# Patient Record
Sex: FEMALE | Race: BLACK OR AFRICAN AMERICAN | Employment: OTHER | URBAN - METROPOLITAN AREA
[De-identification: names, ages, dates, MRNs, and addresses within clinical notes are randomized per-mention and may not be internally consistent; named-entity substitution may affect disease eponyms.]

---

## 2017-04-10 ENCOUNTER — ANESTHESIA EVENT (OUTPATIENT)
Dept: SURGERY | Age: 77
End: 2017-04-10
Payer: MEDICARE

## 2017-04-10 RX ORDER — SODIUM CHLORIDE, SODIUM LACTATE, POTASSIUM CHLORIDE, CALCIUM CHLORIDE 600; 310; 30; 20 MG/100ML; MG/100ML; MG/100ML; MG/100ML
75 INJECTION, SOLUTION INTRAVENOUS CONTINUOUS
Status: CANCELLED | OUTPATIENT
Start: 2017-04-11 | End: 2017-04-11

## 2017-04-11 ENCOUNTER — ANESTHESIA (OUTPATIENT)
Dept: SURGERY | Age: 77
End: 2017-04-11
Payer: MEDICARE

## 2017-04-11 ENCOUNTER — HOSPITAL ENCOUNTER (OUTPATIENT)
Age: 77
Setting detail: OUTPATIENT SURGERY
Discharge: HOME | End: 2017-04-11
Attending: SURGERY | Admitting: SURGERY
Payer: MEDICARE

## 2017-04-11 VITALS
HEART RATE: 65 BPM | BODY MASS INDEX: 30.58 KG/M2 | RESPIRATION RATE: 18 BRPM | TEMPERATURE: 97.7 F | OXYGEN SATURATION: 100 % | DIASTOLIC BLOOD PRESSURE: 62 MMHG | HEIGHT: 61 IN | SYSTOLIC BLOOD PRESSURE: 142 MMHG | WEIGHT: 162 LBS

## 2017-04-11 PROBLEM — I73.9 PVD (PERIPHERAL VASCULAR DISEASE) (HCC): Chronic | Status: ACTIVE | Noted: 2017-04-11

## 2017-04-11 LAB
ANION GAP BLD CALC-SCNC: 10 MMOL/L (ref 3–18)
BUN SERPL-MCNC: 32 MG/DL (ref 7–18)
BUN/CREAT SERPL: 4 (ref 12–20)
CALCIUM SERPL-MCNC: 8.8 MG/DL (ref 8.5–10.1)
CHLORIDE SERPL-SCNC: 101 MMOL/L (ref 100–108)
CO2 SERPL-SCNC: 33 MMOL/L (ref 21–32)
CREAT SERPL-MCNC: 7.43 MG/DL (ref 0.6–1.3)
GLUCOSE BLD STRIP.AUTO-MCNC: 100 MG/DL (ref 70–110)
GLUCOSE BLD STRIP.AUTO-MCNC: 130 MG/DL (ref 70–110)
GLUCOSE SERPL-MCNC: 126 MG/DL (ref 74–99)
POTASSIUM SERPL-SCNC: 4.1 MMOL/L (ref 3.5–5.5)
SODIUM SERPL-SCNC: 144 MMOL/L (ref 136–145)

## 2017-04-11 PROCEDURE — 74011000272 HC RX REV CODE- 272: Performed by: SURGERY

## 2017-04-11 PROCEDURE — 77030002987 HC SUT PROL J&J -B: Performed by: SURGERY

## 2017-04-11 PROCEDURE — 93005 ELECTROCARDIOGRAM TRACING: CPT

## 2017-04-11 PROCEDURE — 76060000033 HC ANESTHESIA 1 TO 1.5 HR: Performed by: SURGERY

## 2017-04-11 PROCEDURE — 77030010507 HC ADH SKN DERMBND J&J -B: Performed by: SURGERY

## 2017-04-11 PROCEDURE — 77030018836 HC SOL IRR NACL ICUM -A: Performed by: SURGERY

## 2017-04-11 PROCEDURE — 77030012510 HC MSK AIRWY LMA TELE -B: Performed by: ANESTHESIOLOGY

## 2017-04-11 PROCEDURE — 82962 GLUCOSE BLOOD TEST: CPT

## 2017-04-11 PROCEDURE — 77030031139 HC SUT VCRL2 J&J -A: Performed by: SURGERY

## 2017-04-11 PROCEDURE — 77030002996 HC SUT SLK J&J -A: Performed by: SURGERY

## 2017-04-11 PROCEDURE — 74011250636 HC RX REV CODE- 250/636

## 2017-04-11 PROCEDURE — 74011250636 HC RX REV CODE- 250/636: Performed by: NURSE ANESTHETIST, CERTIFIED REGISTERED

## 2017-04-11 PROCEDURE — 36415 COLL VENOUS BLD VENIPUNCTURE: CPT | Performed by: NURSE ANESTHETIST, CERTIFIED REGISTERED

## 2017-04-11 PROCEDURE — 74011000258 HC RX REV CODE- 258: Performed by: SURGERY

## 2017-04-11 PROCEDURE — 77030014647 HC SEAL FBRN TISSL BAXT -D: Performed by: SURGERY

## 2017-04-11 PROCEDURE — 76210000021 HC REC RM PH II 0.5 TO 1 HR: Performed by: SURGERY

## 2017-04-11 PROCEDURE — 74011000250 HC RX REV CODE- 250

## 2017-04-11 PROCEDURE — 76210000016 HC OR PH I REC 1 TO 1.5 HR: Performed by: SURGERY

## 2017-04-11 PROCEDURE — 77030013079 HC BLNKT BAIR HGGR 3M -A: Performed by: ANESTHESIOLOGY

## 2017-04-11 PROCEDURE — 80048 BASIC METABOLIC PNL TOTAL CA: CPT | Performed by: NURSE ANESTHETIST, CERTIFIED REGISTERED

## 2017-04-11 PROCEDURE — 77030032490 HC SLV COMPR SCD KNE COVD -B: Performed by: SURGERY

## 2017-04-11 PROCEDURE — 77030011267 HC ELECTRD BLD COVD -A: Performed by: SURGERY

## 2017-04-11 PROCEDURE — 74011250637 HC RX REV CODE- 250/637: Performed by: NURSE ANESTHETIST, CERTIFIED REGISTERED

## 2017-04-11 PROCEDURE — 77030018846 HC SOL IRR STRL H20 ICUM -A: Performed by: SURGERY

## 2017-04-11 PROCEDURE — 74011000250 HC RX REV CODE- 250: Performed by: SURGERY

## 2017-04-11 PROCEDURE — 77030020256 HC SOL INJ NACL 0.9%  500ML: Performed by: SURGERY

## 2017-04-11 PROCEDURE — 76010000149 HC OR TIME 1 TO 1.5 HR: Performed by: SURGERY

## 2017-04-11 PROCEDURE — 74011250636 HC RX REV CODE- 250/636: Performed by: SURGERY

## 2017-04-11 PROCEDURE — 77030002933 HC SUT MCRYL J&J -A: Performed by: SURGERY

## 2017-04-11 RX ORDER — OXYCODONE AND ACETAMINOPHEN 5; 325 MG/1; MG/1
1 TABLET ORAL
Qty: 30 TAB | Refills: 0 | Status: SHIPPED | OUTPATIENT
Start: 2017-04-11 | End: 2018-09-25

## 2017-04-11 RX ORDER — MAGNESIUM SULFATE 100 %
4 CRYSTALS MISCELLANEOUS AS NEEDED
Status: DISCONTINUED | OUTPATIENT
Start: 2017-04-11 | End: 2017-04-11 | Stop reason: HOSPADM

## 2017-04-11 RX ORDER — PROPOFOL 10 MG/ML
INJECTION, EMULSION INTRAVENOUS AS NEEDED
Status: DISCONTINUED | OUTPATIENT
Start: 2017-04-11 | End: 2017-04-11 | Stop reason: HOSPADM

## 2017-04-11 RX ORDER — SODIUM CHLORIDE, SODIUM LACTATE, POTASSIUM CHLORIDE, CALCIUM CHLORIDE 600; 310; 30; 20 MG/100ML; MG/100ML; MG/100ML; MG/100ML
50 INJECTION, SOLUTION INTRAVENOUS CONTINUOUS
Status: DISCONTINUED | OUTPATIENT
Start: 2017-04-11 | End: 2017-04-11 | Stop reason: HOSPADM

## 2017-04-11 RX ORDER — INSULIN LISPRO 100 [IU]/ML
INJECTION, SOLUTION INTRAVENOUS; SUBCUTANEOUS ONCE
Status: DISCONTINUED | OUTPATIENT
Start: 2017-04-11 | End: 2017-04-11 | Stop reason: HOSPADM

## 2017-04-11 RX ORDER — FENTANYL CITRATE 50 UG/ML
INJECTION, SOLUTION INTRAMUSCULAR; INTRAVENOUS AS NEEDED
Status: DISCONTINUED | OUTPATIENT
Start: 2017-04-11 | End: 2017-04-11 | Stop reason: HOSPADM

## 2017-04-11 RX ORDER — HYDROMORPHONE HYDROCHLORIDE 2 MG/ML
0.5 INJECTION, SOLUTION INTRAMUSCULAR; INTRAVENOUS; SUBCUTANEOUS
Status: DISCONTINUED | OUTPATIENT
Start: 2017-04-11 | End: 2017-04-11 | Stop reason: HOSPADM

## 2017-04-11 RX ORDER — FENTANYL CITRATE 50 UG/ML
25 INJECTION, SOLUTION INTRAMUSCULAR; INTRAVENOUS
Status: DISCONTINUED | OUTPATIENT
Start: 2017-04-11 | End: 2017-04-11 | Stop reason: HOSPADM

## 2017-04-11 RX ORDER — ONDANSETRON 2 MG/ML
INJECTION INTRAMUSCULAR; INTRAVENOUS AS NEEDED
Status: DISCONTINUED | OUTPATIENT
Start: 2017-04-11 | End: 2017-04-11 | Stop reason: SDUPTHER

## 2017-04-11 RX ORDER — ONDANSETRON 2 MG/ML
4 INJECTION INTRAMUSCULAR; INTRAVENOUS
Status: DISCONTINUED | OUTPATIENT
Start: 2017-04-11 | End: 2017-04-11 | Stop reason: HOSPADM

## 2017-04-11 RX ORDER — LIDOCAINE HYDROCHLORIDE 20 MG/ML
INJECTION, SOLUTION EPIDURAL; INFILTRATION; INTRACAUDAL; PERINEURAL AS NEEDED
Status: DISCONTINUED | OUTPATIENT
Start: 2017-04-11 | End: 2017-04-11 | Stop reason: HOSPADM

## 2017-04-11 RX ORDER — HEPARIN SODIUM 1000 [USP'U]/ML
INJECTION, SOLUTION INTRAVENOUS; SUBCUTANEOUS AS NEEDED
Status: DISCONTINUED | OUTPATIENT
Start: 2017-04-11 | End: 2017-04-11 | Stop reason: HOSPADM

## 2017-04-11 RX ORDER — SODIUM CHLORIDE 9 MG/ML
500 INJECTION, SOLUTION INTRAVENOUS CONTINUOUS
Status: DISCONTINUED | OUTPATIENT
Start: 2017-04-11 | End: 2017-04-11 | Stop reason: HOSPADM

## 2017-04-11 RX ORDER — MIDAZOLAM HYDROCHLORIDE 1 MG/ML
INJECTION, SOLUTION INTRAMUSCULAR; INTRAVENOUS AS NEEDED
Status: DISCONTINUED | OUTPATIENT
Start: 2017-04-11 | End: 2017-04-11 | Stop reason: HOSPADM

## 2017-04-11 RX ORDER — FAMOTIDINE 20 MG/1
20 TABLET, FILM COATED ORAL ONCE
Status: COMPLETED | OUTPATIENT
Start: 2017-04-11 | End: 2017-04-11

## 2017-04-11 RX ORDER — OXYCODONE AND ACETAMINOPHEN 5; 325 MG/1; MG/1
1 TABLET ORAL
Status: DISCONTINUED | OUTPATIENT
Start: 2017-04-11 | End: 2017-04-11 | Stop reason: HOSPADM

## 2017-04-11 RX ORDER — DIPHENHYDRAMINE HYDROCHLORIDE 50 MG/ML
25 INJECTION, SOLUTION INTRAMUSCULAR; INTRAVENOUS
Status: DISCONTINUED | OUTPATIENT
Start: 2017-04-11 | End: 2017-04-11 | Stop reason: HOSPADM

## 2017-04-11 RX ORDER — DEXTROSE 50 % IN WATER (D50W) INTRAVENOUS SYRINGE
25-50 AS NEEDED
Status: DISCONTINUED | OUTPATIENT
Start: 2017-04-11 | End: 2017-04-11 | Stop reason: HOSPADM

## 2017-04-11 RX ADMIN — SODIUM CHLORIDE 500 MG: 900 INJECTION, SOLUTION INTRAVENOUS at 11:39

## 2017-04-11 RX ADMIN — MIDAZOLAM HYDROCHLORIDE 2 MG: 1 INJECTION, SOLUTION INTRAMUSCULAR; INTRAVENOUS at 12:59

## 2017-04-11 RX ADMIN — PROPOFOL 100 MG: 10 INJECTION, EMULSION INTRAVENOUS at 13:05

## 2017-04-11 RX ADMIN — HEPARIN SODIUM 4000 UNITS: 1000 INJECTION, SOLUTION INTRAVENOUS; SUBCUTANEOUS at 13:31

## 2017-04-11 RX ADMIN — FENTANYL CITRATE 50 MCG: 50 INJECTION, SOLUTION INTRAMUSCULAR; INTRAVENOUS at 13:05

## 2017-04-11 RX ADMIN — ONDANSETRON 4 MG: 2 INJECTION INTRAMUSCULAR; INTRAVENOUS at 14:11

## 2017-04-11 RX ADMIN — FAMOTIDINE 20 MG: 20 TABLET ORAL at 11:40

## 2017-04-11 RX ADMIN — SODIUM CHLORIDE 500 ML: 900 INJECTION, SOLUTION INTRAVENOUS at 11:39

## 2017-04-11 RX ADMIN — LIDOCAINE HYDROCHLORIDE 60 MG: 20 INJECTION, SOLUTION EPIDURAL; INFILTRATION; INTRACAUDAL; PERINEURAL at 13:05

## 2017-04-11 RX ADMIN — FENTANYL CITRATE 50 MCG: 50 INJECTION, SOLUTION INTRAMUSCULAR; INTRAVENOUS at 13:45

## 2017-04-11 NOTE — H&P
Surgery History and Physical    Subjective:   64 y/o female - female with ESRD stage 5, HD MWF,  H/o R Constance AVF 4/2014 - several interventions, ultimately R central vein outflow occlusion. L FA loop AVG/artegraft and R constance AVF ligation 5/12/15. Occluded 11/2015 - Ayala open thrombectomy with stent across antecubital area 12/2015. Then 2/2016, 5/2016, 8/2016, and 11/15/16 outflow vein 6mm PTA. Also h/o PVD: 8/2/16 R TPT atherectomy/4mm PTA, R AT occludes at the ankle. LLE - occluded prox peroneal art. Leg fatigue only. Most recently fistulogram with 5mm cutting balloon and 6mm PTA venous outflow 2/1/17. Has developed venous limb AVG (medially) large pseudoaneurysm - 2 1/2 cm with tenderness over the area as it is on the post medial aspect of the forearm and leans on the area sometimes. Denys Tacho Appears the remainder of the AVG is functioning on HD adequately. Presents for pseudoaneurysm repair. Last HD yesterday w/o problems. Patient Active Problem List    Diagnosis Date Noted    ESRD (end stage renal disease) ([facility] [address] 02/11/2014    Seizure disorder ([facility] [address] 02/11/2014    HTN (hypertension) 02/11/2014   ESRD on HD 2014, seizure disorder, HTN, DM, PVD, CAD. Past Medical History:   Diagnosis Date    Arthritis     CAD (coronary artery disease) 2014    triple bypass    Chronic kidney disease     DIALYSIS M-W-F    Diabetes ([address]     Hypertension     Renal dialysis status     S/P CABG x 3       Past Surgical History:   Procedure Laterality Date    ABDOMEN SURGERY PROC UNLISTED  1976    gall stones    CARDIAC SURG PROCEDURE UNLIST  AUG 2014    BYPASS-3 WAY    HX OTHER SURGICAL  1/2015    2 cardiac stents    HX VASCULAR ACCESS  4/2014    right IJ tunneled Dialysis catheter    HX VASCULAR ACCESS      right upper extremitiy fistulagram    ROB CATHETER  2/1/2014         see HPI for AVF/AVG surgeries/ interventions.      Social History   Substance Use Topics    Smoking status: Never Smoker    Smokeless tobacco: Never Used    Alcohol use No      History reviewed. No pertinent family history. Prior to Admission medications    Medication Sig Start Date End Date Taking? Authorizing Provider   calcium acetate (PHOSLO) 667 mg cap Take 2 Caps by mouth two (2) times a day. Yes Historical Provider   atorvastatin (LIPITOR) 10 mg tablet Take 10 mg by mouth daily. Yes Historical Provider   sevelamer (RENAGEL) 800 mg tablet Take  by mouth three (3) times daily (with meals). Yes Historical Provider   losartan (COZAAR) 100 mg tablet Take 1 Tab by mouth daily. 2/11/14  Yes Jessica Rey MD   aspirin 81 mg chewable tablet Take 81 mg by mouth daily. Yes Historical Provider   clopidogrel (PLAVIX) 75 mg tablet Take  by mouth daily. Historical Provider     Allergies   Allergen Reactions    Contrast Agent [Iodine] Rash    Penicillins Hives         Review of Systems:   Gen -no  fever / chills,  Normal appetite. Chronic fatigue, no recent illlnesses. HEENT - denies vision changes, no dysphagia  PULM - no cough/dyspnea  CV - denies chest pain, palpitations - w/ h/o CABG 2014  GI - no abd pain, no n/v, no diarrhea/constipation, no blood per rectum   - denies dysuria/hematuria/frequency  SKIN - no ulcers or dermatitits  MS - h/o DJD/osteroarthritis. NEURO - denies prior stroke or TIA, + seizure history - none recently, no significant headaches, no focal weakness. Objective:     Visit Vitals    Ht 5' 1\" (1.549 m)    Wt 75.8 kg (167 lb)    BMI 31.55 kg/m2     Physical Exam:  GEN: A&Ox3, NAD, comfortable. HEENT:PERRL EOMI, non icteric, moist membranes. NECK: no JVD, no carotid bruit, no LA, no TM  LUNG: clear b/l and unlabored breathing  HEART: regular w/o murmur noted  ABD: soft, NT,  NABS   EXT: no R arm edema, L forearm loop AVG somewhat pulsatile - venous / medial side of loop 2 1/2cm enlarged pseudoaneurysmal area.  On lateral side near arterial anast has somewhat enlarged area ~ 1 1/2 cm for ~4cm with evidence of repeated accesses at this site. PULSES: palpable radial / femoral pulses. Softly palpable pop pulses. Absent pedal pulses - audible doppler signals. Vernida Chard NEURO: no focal lateralizing deficits noted. Motor 5/5. Labs: 4/11 - K 4.1, glc 126, BUN 32, Cr 7.43,     AVVA 11/22/16: bilateral LE art duplex: patent bilateral CFA/SFA/pop all biphasic, distally monophasic; HAMLET R 0.88, L 1.11, TBI R 0.41, L 0.53. Assessment:   1. ESRD on HD via L forearm loop AVG with medial/ venous limb pseudoaneurysm development   2. PVD - last intervention 8/2016 - distal tibial disease bilaterally,   3. Diabetes  4. HTN  5. CAD - h/o CABG 2014 - asymptomatic. Plan:   Plan repair of L forearm loop AVG pseudoaneurysm - surgery / risks d/w pt - including pain, infection, bleeding, wound breakdown, additional intervention, thrombosis - she understands and agrees to proceed. Bea Jorgensen.  Crystal Coelho, [address] [facility]  [place] - [phone]  April 11, 2017  8:34 AM

## 2017-04-11 NOTE — IP AVS SNAPSHOT
Manuel Gibson 
 
 
 [address] 
[phone] Patient: Bettye Garcia MRN: [id number] You are allergic to the following Allergen Reactions Contrast Agent (Iodine) Rash Penicillins Hives Recent Documentation Height Weight BMI OB Status Smoking Status 1.549 m 73.5 kg 30.61 kg/m2 Postmenopausal Never Smoker Emergency Contacts Name Discharge Info Relation Home Work Mobile DooleyJimenezCades. DISCHARGE CAREGIVER [3] Spouse [3] [phone] Wayne Figueroa  Sister [23] [phone] Dayna Flores DISCHARGE CAREGIVER [3] Daughter [21] [phone] About your hospitalization You were admitted on:  April 11, 2017 [facility] You were discharged on:  April 11, 2017 Unit phone number:  [phone] Why you were hospitalized Your primary diagnosis was:  Not on File Providers Seen During Your Hospitalizations Provider Role Specialty Primary office phone Kaleb Villanueva MD Attending Provider Vascular Surgery [phone] Your Primary Care Physician (PCP) Primary Care Physician Office Phone Office Fax Lashell Kan, [address] [facility] [phone] Follow-up Information Follow up With Details Comments Contact Info Jay Zamora MD   [address] [place] [address] 
[phone] Current Discharge Medication List  
  
START taking these medications Dose & Instructions Dispensing Information Comments Morning Noon Evening Bedtime  
 oxyCODONE-acetaminophen 5-325 mg per tablet Commonly known as:  PERCOCET Your last dose was: Your next dose is:    
   
   
 Dose:  1 Tab Take 1 Tab by mouth every four (4) hours as needed for Pain. Max Daily Amount: 6 Tabs. Quantity:  30 Tab Refills:  0 CONTINUE these medications which have NOT CHANGED Dose & Instructions Dispensing Information Comments Morning Noon Evening Bedtime  
 aspirin 81 mg chewable tablet Your last dose was: Your next dose is:    
   
   
 Dose:  81 mg Take 81 mg by mouth daily. Refills:  0  
     
   
   
   
  
 atorvastatin 10 mg tablet Commonly known as:  LIPITOR Your last dose was: Your next dose is:    
   
   
 Dose:  10 mg Take 10 mg by mouth daily. Refills:  0  
     
   
   
   
  
 calcium acetate 667 mg Cap Commonly known as:  PHOSLO Your last dose was: Your next dose is:    
   
   
 Dose:  2 Cap Take 2 Caps by mouth two (2) times a day. Refills:  0  
     
   
   
   
  
 clopidogrel 75 mg Tab Commonly known as:  PLAVIX Your last dose was: Your next dose is: Take  by mouth daily. Refills:  0  
     
   
   
   
  
 losartan 100 mg tablet Commonly known as:  COZAAR Your last dose was: Your next dose is:    
   
   
 Dose:  100 mg Take 1 Tab by mouth daily. Quantity:  30 Tab Refills:  0  
     
   
   
   
  
 sevelamer 800 mg tablet Commonly known as:  RENAGEL Your last dose was: Your next dose is: Take  by mouth three (3) times daily (with meals). Refills:  0 Where to Get Your Medications Information on where to get these meds will be given to you by the nurse or doctor. ! Ask your nurse or doctor about these medications  
  oxyCODONE-acetaminophen 5-325 mg per tablet Discharge Instructions DISCHARGE SUMMARY from Nurse The following personal items are in your possession at time of discharge: 
 
Dental Appliances: None Visual Aid: Glasses Home Medications: None Jewelry: Angelic Jules Clothing: Shirt, Socks, Footwear, Undergarments, Pants (all belongings given to ) Other Valuables: None PATIENT INSTRUCTIONS: 
 
After general anesthesia or intravenous sedation, for 24 hours or while taking prescription Narcotics: · Limit your activities · Do not drive and operate hazardous machinery · Do not make important personal or business decisions · Do  not drink alcoholic beverages · If you have not urinated within 8 hours after discharge, please contact your surgeon on call. Report the following to your surgeon: 
· Excessive pain, swelling, redness or odor of or around the surgical area · Temperature over 100.5 · Nausea and vomiting lasting longer than 4 hours or if unable to take medications · Any signs of decreased circulation or nerve impairment to extremity: change in color, persistent  numbness, tingling, coldness or increase pain · Any questions What to do at Home: These are general instructions for a healthy lifestyle: No smoking/ No tobacco products/ Avoid exposure to second hand smoke Surgeon General's Warning:  Quitting smoking now greatly reduces serious risk to your health. Obesity, smoking, and sedentary lifestyle greatly increases your risk for illness A healthy diet, regular physical exercise & weight monitoring are important for maintaining a healthy lifestyle You may be retaining fluid if you have a history of heart failure or if you experience any of the following symptoms:  Weight gain of 3 pounds or more overnight or 5 pounds in a week, increased swelling in our hands or feet or shortness of breath while lying flat in bed. Please call your doctor as soon as you notice any of these symptoms; do not wait until your next office visit. Recognize signs and symptoms of STROKE: 
 
F-face looks uneven A-arms unable to move or move unevenly S-speech slurred or non-existent T-time-call 911 as soon as signs and symptoms begin-DO NOT go Back to bed or wait to see if you get better-TIME IS BRAIN.  
 
Warning Signs of HEART ATTACK  
 
 Call 911 if you have these symptoms: 
? Chest discomfort. Most heart attacks involve discomfort in the center of the chest that lasts more than a few minutes, or that goes away and comes back. It can feel like uncomfortable pressure, squeezing, fullness, or pain. ? Discomfort in other areas of the upper body. Symptoms can include pain or discomfort in one or both arms, the back, neck, jaw, or stomach. ? Shortness of breath with or without chest discomfort. ? Other signs may include breaking out in a cold sweat, nausea, or lightheadedness. Don't wait more than five minutes to call [address]! Fast action can save your life. Calling 911 is almost always the fastest way to get lifesaving treatment. Emergency Medical Services staff can begin treatment when they arrive  up to an hour sooner than if someone gets to the hospital by car. The discharge information has been reviewed with the patient. The patient verbalized understanding. Discharge medications reviewed with the patient and appropriate educational materials and side effects teaching were provided. Patient armband removed and given to patient to take home. Patient was informed of the privacy risks if armband lost or stolen Discharge Orders None Introducing [facility]! [employer] introduces [employer]Seek patient portal. Now you can access parts of your medical record, email your doctor's office, and request medication refills online. 1. In your internet browser, go to [email or web address] 2. Click on the First Time User? Click Here link in the Sign In box. You will see the New Member Sign Up page. 3. Enter your StyleSeek Access Code exactly as it appears below. You will not need to use this code after youve completed the sign-up process. If you do not sign up before the expiration date, you must request a new code. · StyleSeek Access Code: [email or web address] Expires: 7/5/2017 11:46 AM 
 
 4. Enter the last four digits of your Social Security Number (xxxx) and Date of Birth (mm/dd/yyyy) as indicated and click Submit. You will be taken to the next sign-up page. 5. Create a Haversack ID. This will be your Haversack login ID and cannot be changed, so think of one that is secure and easy to remember. 6. Create a Ha[employer]ack password. You can change your password at any time. 7. Enter your Password Reset Question and Answer. This can be used at a later time if you forget your password. 8. Enter your e-mail address. You will receive e-mail notification when new information is available in [address] 9. Click Sign Up. You can now view and download portions of your medical record. 10. Click the Download Summary menu link to download a portable copy of your medical information. If you have questions, please visit the Frequently Asked Questions section of the [employer] website. Remember, Haversack is NOT to be used for urgent needs. For medical emergencies, dial 911. Now available from your iPhone and Android! General Information Please provide this summary of care documentation to your next provider. Patient Signature:  ____________________________________________________________ Date:  ____________________________________________________________  
  
Arna Star Provider Signature:  ____________________________________________________________ Date:  ____________________________________________________________

## 2017-04-11 NOTE — OP NOTES
Ravi Kim    Name:  Maite Santos  MR#:  [id number]  :  1940  Account #:  [de-identified]  Date of Adm:  2017  Date of Surgery:  2017      PREOPERATIVE DIAGNOSIS: End-stage renal disease on  hemodialysis with left forearm loop arteriovenous graft  pseudoaneurysm. POSTOPERATIVE DIAGNOSIS: End-stage renal disease on  hemodialysis with left forearm loop arteriovenous graft  pseudoaneurysm. PROCEDURE PERFORMED: Left forearm loop arteriovenous graft  pseudoaneurysm resection with primary repair end-to-end. SURGEON AND : Haroldo Walker MD.    SURGICAL ASSISTANT:  Francisca Akhtar. Time in is 1315. Completion is 1415. ANESTHESIA: General via LMA. ESTIMATED BLOOD LOSS: 10 mL. FLUIDS: 250 saline. PREOPERATIVE ANTIBIOTIC: Vancomycin. SPECIMENS: None. FINDINGS: 2.5 cm pseudoaneurysm on the medial/venous limb of the  left forearm loop arteriovenous graft. This was resected with primary  end-to-end repair. At completion, there was a somewhat pulsatile thrill  in the AV graft and a softly palpable radial pulse. COMPLICATIONS: None. IMPLANTS: None. DESCRIPTION OF PROCEDURE: After consent was obtained, she  was taken to the operating room. After satisfactory induction of general  anesthesia, left arm was prepped from the fingers to the shoulder with  ChloraPrep and allowed to dry for 3 minutes and draped. A procedure  time-out was performed. Approximately a 5 cm longitudinal incision  was made over the graft somewhat posteriorly and the graft was then  dissected free, including pseudoaneurysm with electrocautery  circumferentially. We then freed up approximately 2 cm in either  direction beyond the incision due to the laxity of subcutaneous tissues  for a primary repair.  After complete dissecting this free  circumferentially around the graft and the pseudoaneurysm the patient  was given 4000 units of heparin and after 3 minutes, and both the ends  were clamped with DeBakey clamps. The pseudoaneurysm was transected   on each side. It was approximately 2 cm posteriorly  approximately 3 cm anteriorly where it ballooned out. We were able to  bring the 2 ends together without significant tension. We made  the  anastomosis with a running 5-0 Prolene suture. Prior to completing the  primary anastomosis, we flushed the arterial and venous limb and  irrigated with heparinized saline. The anastomosis was completed. Clamps were removed. We had good hemostasis. There was a  somewhat pulsatile thrill. Placed thrombin and Gelfoam. After  hemostasis, a thin layer of flowseal was placed throughout and the subcutaneous tissues  Were closed with running 3-0 Vicryl suture. The skin was closed with running 4-0  Monocryl in a subcuticular layer. Dermabond skin adhesive was  applied with a thin strip of Telfa, Tegaderm dressing. The incision was  somewhat posterior to the AV graft and the final closure.         MD ANKUR Conti / BUD  D:  04/11/2017   14:19  T:  04/11/2017   14:48  Job #:  [id number]

## 2017-04-11 NOTE — ANESTHESIA PREPROCEDURE EVALUATION
Anesthetic History   No history of anesthetic complications            Review of Systems / Medical History  Patient summary reviewed, nursing notes reviewed and pertinent labs reviewed    Pulmonary  Within defined limits                 Neuro/Psych     seizures: well controlled         Cardiovascular    Hypertension: well controlled          CAD         GI/Hepatic/Renal         Renal disease: ESRD and dialysis       Endo/Other    Diabetes    Arthritis     Other Findings   Comments:   Risk Factors for Postoperative nausea/vomiting:       History of postoperative nausea/vomiting? NO       Female? YES       Motion sickness? NO       Intended opioid administration for postoperative analgesia?   YES           Physical Exam    Airway  Mallampati: I  TM Distance: 4 - 6 cm  Neck ROM: normal range of motion   Mouth opening: Normal     Cardiovascular    Rhythm: regular  Rate: normal         Dental  No notable dental hx       Pulmonary  Breath sounds clear to auscultation               Abdominal  GI exam deferred       Other Findings            Anesthetic Plan    ASA: 4  Anesthesia type: general          Induction: Intravenous  Anesthetic plan and risks discussed with: Patient

## 2017-04-11 NOTE — ANESTHESIA POSTPROCEDURE EVALUATION
Post-Anesthesia Evaluation and Assessment    Patient: Ainta Newell MRN: [id number]  SSN: [id number]    YOB: 1940  Age: 65 y.o. Sex: female       Cardiovascular Function/Vital Signs  Visit Vitals    /63    Pulse 68    Temp 36.5 °C (97.7 °F)    Resp 18    Ht 5' 1\" (1.549 m)    Wt 73.5 kg (162 lb)    SpO2 98%    BMI 30.61 kg/m2       Patient is status post general anesthesia for Procedure(s):  left arm pseudoaneurysm repair. Nausea/Vomiting: None    Postoperative hydration reviewed and adequate. Pain:  Pain Scale 1: Numeric (0 - 10) (04/11/17 1458)  Pain Intensity 1: 0 (04/11/17 1458)   Managed    Neurological Status:   Neuro (WDL): Within Defined Limits (04/11/17 1458)   At baseline    Mental Status and Level of Consciousness: Arousable    Pulmonary Status:   O2 Device: Room air (04/11/17 1458)   Adequate oxygenation and airway patent    Complications related to anesthesia: None    Post-anesthesia assessment completed.  No concerns    Signed By: Jac Brink MD     April 11, 2017

## 2017-04-11 NOTE — DISCHARGE INSTRUCTIONS
DISCHARGE SUMMARY from Nurse    The following personal items are in your possession at time of discharge:    Dental Appliances: None  Visual Aid: Glasses     Home Medications: None  Jewelry: Earrings  Clothing: Shirt, Socks, Footwear, Undergarments, Pants (all belongings given to )  Other Valuables: None             PATIENT INSTRUCTIONS:    After general anesthesia or intravenous sedation, for 24 hours or while taking prescription Narcotics:  · Limit your activities  · Do not drive and operate hazardous machinery  · Do not make important personal or business decisions  · Do  not drink alcoholic beverages  · If you have not urinated within 8 hours after discharge, please contact your surgeon on call. Report the following to your surgeon:  · Excessive pain, swelling, redness or odor of or around the surgical area  · Temperature over 100.5  · Nausea and vomiting lasting longer than 4 hours or if unable to take medications  · Any signs of decreased circulation or nerve impairment to extremity: change in color, persistent  numbness, tingling, coldness or increase pain  · Any questions        What to do at Home:  These are general instructions for a healthy lifestyle:    No smoking/ No tobacco products/ Avoid exposure to second hand smoke    Surgeon General's Warning:  Quitting smoking now greatly reduces serious risk to your health. Obesity, smoking, and sedentary lifestyle greatly increases your risk for illness    A healthy diet, regular physical exercise & weight monitoring are important for maintaining a healthy lifestyle    You may be retaining fluid if you have a history of heart failure or if you experience any of the following symptoms:  Weight gain of 3 pounds or more overnight or 5 pounds in a week, increased swelling in our hands or feet or shortness of breath while lying flat in bed.   Please call your doctor as soon as you notice any of these symptoms; do not wait until your next office visit. Recognize signs and symptoms of STROKE:    F-face looks uneven    A-arms unable to move or move unevenly    S-speech slurred or non-existent    T-time-call 911 as soon as signs and symptoms begin-DO NOT go       Back to bed or wait to see if you get better-TIME IS BRAIN. Warning Signs of HEART ATTACK     Call 911 if you have these symptoms:   Chest discomfort. Most heart attacks involve discomfort in the center of the chest that lasts more than a few minutes, or that goes away and comes back. It can feel like uncomfortable pressure, squeezing, fullness, or pain.  Discomfort in other areas of the upper body. Symptoms can include pain or discomfort in one or both arms, the back, neck, jaw, or stomach.  Shortness of breath with or without chest discomfort.  Other signs may include breaking out in a cold sweat, nausea, or lightheadedness. Don't wait more than five minutes to call 911 - MINUTES MATTER! Fast action can save your life. Calling 911 is almost always the fastest way to get lifesaving treatment. Emergency Medical Services staff can begin treatment when they arrive -- up to an hour sooner than if someone gets to the hospital by car. The discharge information has been reviewed with the patient. The patient verbalized understanding. Discharge medications reviewed with the patient and appropriate educational materials and side effects teaching were provided. Patient armband removed and given to patient to take home.   Patient was informed of the privacy risks if armband lost or stolen

## 2017-04-12 LAB
ATRIAL RATE: 71 BPM
CALCULATED P AXIS, ECG09: 51 DEGREES
CALCULATED R AXIS, ECG10: 19 DEGREES
CALCULATED T AXIS, ECG11: 109 DEGREES
DIAGNOSIS, 93000: NORMAL
P-R INTERVAL, ECG05: 158 MS
Q-T INTERVAL, ECG07: 434 MS
QRS DURATION, ECG06: 70 MS
QTC CALCULATION (BEZET), ECG08: 471 MS
VENTRICULAR RATE, ECG03: 71 BPM

## 2018-09-24 ENCOUNTER — ANESTHESIA EVENT (OUTPATIENT)
Dept: SURGERY | Age: 78
End: 2018-09-24
Payer: MEDICARE

## 2018-09-25 ENCOUNTER — ANESTHESIA (OUTPATIENT)
Dept: SURGERY | Age: 78
End: 2018-09-25
Payer: MEDICARE

## 2018-09-25 ENCOUNTER — HOSPITAL ENCOUNTER (OUTPATIENT)
Age: 78
Setting detail: OUTPATIENT SURGERY
Discharge: HOME | End: 2018-09-25
Attending: SURGERY | Admitting: SURGERY
Payer: MEDICARE

## 2018-09-25 VITALS
HEART RATE: 73 BPM | OXYGEN SATURATION: 93 % | SYSTOLIC BLOOD PRESSURE: 143 MMHG | TEMPERATURE: 97.1 F | HEIGHT: 61 IN | RESPIRATION RATE: 20 BRPM | DIASTOLIC BLOOD PRESSURE: 63 MMHG | BODY MASS INDEX: 29.36 KG/M2 | WEIGHT: 155.5 LBS

## 2018-09-25 DIAGNOSIS — N18.6 ESRD (END STAGE RENAL DISEASE) (HCC): Primary | ICD-10-CM

## 2018-09-25 LAB
BUN BLD-MCNC: 34 MG/DL (ref 7–18)
CHLORIDE BLD-SCNC: 98 MMOL/L (ref 100–108)
GLUCOSE BLD STRIP.AUTO-MCNC: 119 MG/DL (ref 74–106)
GLUCOSE BLD STRIP.AUTO-MCNC: 135 MG/DL (ref 70–110)
HCT VFR BLD CALC: 36 % (ref 36–49)
HGB BLD-MCNC: 12.2 G/DL (ref 12–16)
POTASSIUM BLD-SCNC: 4.7 MMOL/L (ref 3.5–5.5)
SODIUM BLD-SCNC: 140 MMOL/L (ref 136–145)

## 2018-09-25 PROCEDURE — 76060000036 HC ANESTHESIA 2.5 TO 3 HR: Performed by: SURGERY

## 2018-09-25 PROCEDURE — 76010000132 HC OR TIME 2.5 TO 3 HR: Performed by: SURGERY

## 2018-09-25 PROCEDURE — 74011250636 HC RX REV CODE- 250/636: Performed by: NURSE ANESTHETIST, CERTIFIED REGISTERED

## 2018-09-25 PROCEDURE — 74011000250 HC RX REV CODE- 250: Performed by: SURGERY

## 2018-09-25 PROCEDURE — 77030032490 HC SLV COMPR SCD KNE COVD -B: Performed by: SURGERY

## 2018-09-25 PROCEDURE — 77030020256 HC SOL INJ NACL 0.9%  500ML: Performed by: SURGERY

## 2018-09-25 PROCEDURE — 77030018846 HC SOL IRR STRL H20 ICUM -A: Performed by: SURGERY

## 2018-09-25 PROCEDURE — 74011000250 HC RX REV CODE- 250

## 2018-09-25 PROCEDURE — 74011250637 HC RX REV CODE- 250/637: Performed by: NURSE ANESTHETIST, CERTIFIED REGISTERED

## 2018-09-25 PROCEDURE — 77030002924 HC SUT GORTX WLGO -B: Performed by: SURGERY

## 2018-09-25 PROCEDURE — 74011250636 HC RX REV CODE- 250/636: Performed by: SURGERY

## 2018-09-25 PROCEDURE — 77030012510 HC MSK AIRWY LMA TELE -B: Performed by: ANESTHESIOLOGY

## 2018-09-25 PROCEDURE — 77030013079 HC BLNKT BAIR HGGR 3M -A: Performed by: ANESTHESIOLOGY

## 2018-09-25 PROCEDURE — 77030010507 HC ADH SKN DERMBND J&J -B: Performed by: SURGERY

## 2018-09-25 PROCEDURE — 77030011267 HC ELECTRD BLD COVD -A: Performed by: SURGERY

## 2018-09-25 PROCEDURE — 82947 ASSAY GLUCOSE BLOOD QUANT: CPT

## 2018-09-25 PROCEDURE — 74011000272 HC RX REV CODE- 272: Performed by: SURGERY

## 2018-09-25 PROCEDURE — 76210000021 HC REC RM PH II 0.5 TO 1 HR: Performed by: SURGERY

## 2018-09-25 PROCEDURE — 77030002996 HC SUT SLK J&J -A: Performed by: SURGERY

## 2018-09-25 PROCEDURE — 82962 GLUCOSE BLOOD TEST: CPT

## 2018-09-25 PROCEDURE — 77030014647 HC SEAL FBRN TISSL BAXT -D: Performed by: SURGERY

## 2018-09-25 PROCEDURE — 77030018836 HC SOL IRR NACL ICUM -A: Performed by: SURGERY

## 2018-09-25 PROCEDURE — C1768 GRAFT, VASCULAR: HCPCS | Performed by: SURGERY

## 2018-09-25 PROCEDURE — 74011250636 HC RX REV CODE- 250/636

## 2018-09-25 PROCEDURE — 76210000006 HC OR PH I REC 0.5 TO 1 HR: Performed by: SURGERY

## 2018-09-25 DEVICE — GORE ACUSEAL VASCULAR GRAFT 4-7MMX45CM TAPERED HEPARIN
Type: IMPLANTABLE DEVICE | Site: ARM | Status: FUNCTIONAL
Brand: GORE ACUSEAL VASCULAR GRAFT

## 2018-09-25 RX ORDER — HYDROCODONE BITARTRATE AND ACETAMINOPHEN 5; 325 MG/1; MG/1
1 TABLET ORAL
Qty: 30 TAB | Refills: 0 | Status: SHIPPED | OUTPATIENT
Start: 2018-09-25 | End: 2018-10-11

## 2018-09-25 RX ORDER — DEXTROSE MONOHYDRATE 25 G/50ML
25-50 INJECTION, SOLUTION INTRAVENOUS AS NEEDED
Status: DISCONTINUED | OUTPATIENT
Start: 2018-09-25 | End: 2018-09-25 | Stop reason: HOSPADM

## 2018-09-25 RX ORDER — HYDROMORPHONE HYDROCHLORIDE 2 MG/ML
0.5 INJECTION, SOLUTION INTRAMUSCULAR; INTRAVENOUS; SUBCUTANEOUS
Status: DISCONTINUED | OUTPATIENT
Start: 2018-09-25 | End: 2018-09-25 | Stop reason: HOSPADM

## 2018-09-25 RX ORDER — INSULIN LISPRO 100 [IU]/ML
INJECTION, SOLUTION INTRAVENOUS; SUBCUTANEOUS ONCE
Status: DISCONTINUED | OUTPATIENT
Start: 2018-09-25 | End: 2018-09-25 | Stop reason: HOSPADM

## 2018-09-25 RX ORDER — ONDANSETRON 2 MG/ML
INJECTION INTRAMUSCULAR; INTRAVENOUS AS NEEDED
Status: DISCONTINUED | OUTPATIENT
Start: 2018-09-25 | End: 2018-09-25 | Stop reason: HOSPADM

## 2018-09-25 RX ORDER — FENTANYL CITRATE 50 UG/ML
25 INJECTION, SOLUTION INTRAMUSCULAR; INTRAVENOUS AS NEEDED
Status: DISCONTINUED | OUTPATIENT
Start: 2018-09-25 | End: 2018-09-25 | Stop reason: HOSPADM

## 2018-09-25 RX ORDER — HYDROCODONE BITARTRATE AND ACETAMINOPHEN 5; 325 MG/1; MG/1
1 TABLET ORAL
Status: DISCONTINUED | OUTPATIENT
Start: 2018-09-25 | End: 2018-09-25 | Stop reason: HOSPADM

## 2018-09-25 RX ORDER — EPHEDRINE SULFATE 50 MG/ML
INJECTION, SOLUTION INTRAVENOUS AS NEEDED
Status: DISCONTINUED | OUTPATIENT
Start: 2018-09-25 | End: 2018-09-25 | Stop reason: HOSPADM

## 2018-09-25 RX ORDER — PROPOFOL 10 MG/ML
INJECTION, EMULSION INTRAVENOUS AS NEEDED
Status: DISCONTINUED | OUTPATIENT
Start: 2018-09-25 | End: 2018-09-25 | Stop reason: HOSPADM

## 2018-09-25 RX ORDER — ONDANSETRON 2 MG/ML
4 INJECTION INTRAMUSCULAR; INTRAVENOUS
Status: DISCONTINUED | OUTPATIENT
Start: 2018-09-25 | End: 2018-09-25 | Stop reason: HOSPADM

## 2018-09-25 RX ORDER — FAMOTIDINE 20 MG/1
20 TABLET, FILM COATED ORAL ONCE
Status: COMPLETED | OUTPATIENT
Start: 2018-09-25 | End: 2018-09-25

## 2018-09-25 RX ORDER — FENTANYL CITRATE 50 UG/ML
INJECTION, SOLUTION INTRAMUSCULAR; INTRAVENOUS AS NEEDED
Status: DISCONTINUED | OUTPATIENT
Start: 2018-09-25 | End: 2018-09-25 | Stop reason: HOSPADM

## 2018-09-25 RX ORDER — SODIUM CHLORIDE 9 MG/ML
25 INJECTION, SOLUTION INTRAVENOUS CONTINUOUS
Status: DISCONTINUED | OUTPATIENT
Start: 2018-09-25 | End: 2018-09-25 | Stop reason: HOSPADM

## 2018-09-25 RX ORDER — MAGNESIUM SULFATE 100 %
4 CRYSTALS MISCELLANEOUS AS NEEDED
Status: DISCONTINUED | OUTPATIENT
Start: 2018-09-25 | End: 2018-09-25 | Stop reason: HOSPADM

## 2018-09-25 RX ORDER — HEPARIN SODIUM 1000 [USP'U]/ML
INJECTION, SOLUTION INTRAVENOUS; SUBCUTANEOUS AS NEEDED
Status: DISCONTINUED | OUTPATIENT
Start: 2018-09-25 | End: 2018-09-25 | Stop reason: HOSPADM

## 2018-09-25 RX ORDER — LIDOCAINE HYDROCHLORIDE 20 MG/ML
INJECTION, SOLUTION EPIDURAL; INFILTRATION; INTRACAUDAL; PERINEURAL AS NEEDED
Status: DISCONTINUED | OUTPATIENT
Start: 2018-09-25 | End: 2018-09-25 | Stop reason: HOSPADM

## 2018-09-25 RX ADMIN — SODIUM CHLORIDE: 900 INJECTION, SOLUTION INTRAVENOUS at 14:29

## 2018-09-25 RX ADMIN — PROPOFOL 100 MG: 10 INJECTION, EMULSION INTRAVENOUS at 14:38

## 2018-09-25 RX ADMIN — LIDOCAINE HYDROCHLORIDE 100 MG: 20 INJECTION, SOLUTION EPIDURAL; INFILTRATION; INTRACAUDAL; PERINEURAL at 14:38

## 2018-09-25 RX ADMIN — EPHEDRINE SULFATE 5 MG: 50 INJECTION, SOLUTION INTRAVENOUS at 15:12

## 2018-09-25 RX ADMIN — HEPARIN SODIUM 4000 UNITS: 1000 INJECTION, SOLUTION INTRAVENOUS; SUBCUTANEOUS at 15:18

## 2018-09-25 RX ADMIN — EPHEDRINE SULFATE 10 MG: 50 INJECTION, SOLUTION INTRAVENOUS at 15:01

## 2018-09-25 RX ADMIN — FENTANYL CITRATE 25 MCG: 50 INJECTION, SOLUTION INTRAMUSCULAR; INTRAVENOUS at 14:53

## 2018-09-25 RX ADMIN — PROPOFOL 50 MG: 10 INJECTION, EMULSION INTRAVENOUS at 14:53

## 2018-09-25 RX ADMIN — SODIUM CHLORIDE 1000 MG: 900 INJECTION, SOLUTION INTRAVENOUS at 13:52

## 2018-09-25 RX ADMIN — ONDANSETRON 4 MG: 2 INJECTION INTRAMUSCULAR; INTRAVENOUS at 16:56

## 2018-09-25 RX ADMIN — SODIUM CHLORIDE 1000 MG: 900 INJECTION, SOLUTION INTRAVENOUS at 12:16

## 2018-09-25 RX ADMIN — EPHEDRINE SULFATE 5 MG: 50 INJECTION, SOLUTION INTRAVENOUS at 15:46

## 2018-09-25 RX ADMIN — FAMOTIDINE 20 MG: 20 TABLET ORAL at 12:16

## 2018-09-25 RX ADMIN — FENTANYL CITRATE 25 MCG: 50 INJECTION, SOLUTION INTRAMUSCULAR; INTRAVENOUS at 15:25

## 2018-09-25 RX ADMIN — FENTANYL CITRATE 25 MCG: 50 INJECTION, SOLUTION INTRAMUSCULAR; INTRAVENOUS at 15:12

## 2018-09-25 RX ADMIN — FENTANYL CITRATE 25 MCG: 50 INJECTION, SOLUTION INTRAMUSCULAR; INTRAVENOUS at 16:22

## 2018-09-25 NOTE — ANESTHESIA PREPROCEDURE EVALUATION
Anesthetic History No history of anesthetic complications Review of Systems / Medical History Patient summary reviewed and pertinent labs reviewed Pulmonary Within defined limits Neuro/Psych Within defined limits 
seizures Cardiovascular Within defined limits Hypertension CAD and cardiac stents Exercise tolerance: <4 METS 
  
GI/Hepatic/Renal 
Within defined limits Renal disease: ESRD and dialysis Endo/Other Within defined limits Diabetes Arthritis Other Findings Physical Exam 
 
Airway Mallampati: II 
TM Distance: 4 - 6 cm Neck ROM: normal range of motion Mouth opening: Normal 
 
 Cardiovascular Rhythm: regular Rate: normal 
 
 
 
 Dental 
No notable dental hx Pulmonary Breath sounds clear to auscultation Abdominal 
GI exam deferred Other Findings Anesthetic Plan ASA: 3 Anesthesia type: general 
 
 
 
 
Induction: Intravenous Anesthetic plan and risks discussed with: Patient

## 2018-09-25 NOTE — IP AVS SNAPSHOT
[facility] 
 
 
 [address] 
[phone] Patient: Mickie Leary MRN: [id number] YL:[id number] About your hospitalization You were admitted on:  September 25, 2018 You last received care in the:  [facility] PACU You were discharged on:  September 25, 2018 Why you were hospitalized Your primary diagnosis was:  Not on File Follow-up Information Follow up With Details Comments Contact Info Jeanette Bueno MD   [phone] 
[phone] Discharge Orders None A check anaya indicates which time of day the medication should be taken. My Medications START taking these medications Instructions Each Dose to Equal  
 Morning Noon Evening Bedtime HYDROcodone-acetaminophen 5-325 mg per tablet Commonly known as:  Renetta Felipe Your last dose was: Your next dose is: Take 1 Tab by mouth every four (4) hours as needed for Pain. Max Daily Amount: 6 Tabs. 1 Tab CONTINUE taking these medications Instructions Each Dose to Equal  
 Morning Noon Evening Bedtime  
 aspirin 81 mg chewable tablet Your last dose was: Your next dose is: Take 81 mg by mouth daily. 81 mg  
    
   
   
   
  
 atorvastatin 10 mg tablet Commonly known as:  LIPITOR Your last dose was: Your next dose is: Take 10 mg by mouth daily. 10 mg  
    
   
   
   
  
 calcium acetate 667 mg Cap Commonly known as:  PHOSLO Your last dose was: Your next dose is: Take 2 Caps by mouth two (2) times a day. 2 Cap  
    
   
   
   
  
 losartan 100 mg tablet Commonly known as:  COZAAR Your last dose was: Your next dose is: Take 1 Tab by mouth daily. 100 mg  
    
   
   
   
  
 sevelamer 800 mg tablet Commonly known as:  RENAGEL  
   
 Your last dose was: Your next dose is: Take  by mouth three (3) times daily (with meals). STOP taking these medications   
 oxyCODONE-acetaminophen 5-325 mg per tablet Commonly known as:  PERCOCET Where to Get Your Medications Information on where to get these meds will be given to you by the nurse or doctor. ! Ask your nurse or doctor about these medications HYDROcodone-acetaminophen 5-325 mg per tablet Opioid Education Prescription Opioids: What You Need to Know: 
 
Prescription opioids can be used to help relieve moderate-to-severe pain and are often prescribed following a surgery or injury, or for certain health conditions. These medications can be an important part of treatment but also come with serious risks. Opioids are strong pain medicines. Examples include hydrocodone, oxycodone, fentanyl, and morphine. Heroin is an example of an illegal opioid. It is important to work with your health care provider to make sure you are getting the safest, most effective care. WHAT ARE THE RISKS AND SIDE EFFECTS OF OPIOID USE? Prescription opioids carry serious risks of addiction and overdose, especially with prolonged use. An opioid overdose, often marked by slow breathing, can cause sudden death. The use of prescription opioids can have a number of side effects as well, even when taken as directed. · Tolerance-meaning you might need to take more of a medication for the same pain relief · Physical dependence-meaning you have symptoms of withdrawal when the medication is stopped. Withdrawal symptoms can include nausea, sweating, chills, diarrhea, stomach cramps, and muscle aches. Withdrawal can last up to several weeks, depending on which drug you took and how long you took it. · Increased sensitivity to pain · Constipation · Nausea, vomiting, and dry mouth · Sleepiness and dizziness · Confusion · Depression · Low levels of testosterone that can result in lower sex drive, energy, and strength · Itching and sweating RISKS ARE GREATER WITH:      
· History of drug misuse, substance use disorder, or overdose · Mental health conditions (such as depression or anxiety) · Sleep apnea · Older age (72 years or older) · Pregnancy Avoid alcohol while taking prescription opioids. Also, unless specifically advised by your health care provider, medications to avoid include: · Benzodiazepines (such as Xanax or Valium) · Muscle relaxants (such as Soma or Flexeril) · Hypnotics (such as Ambien or Lunesta) · Other prescription opioids KNOW YOUR OPTIONS Talk to your health care provider about ways to manage your pain that don't involve prescription opioids. Some of these options may actually work better and have fewer risks and side effects. Consult your physician before adding or stopping any medications, treatments, or physical activity. Options may include: 
· Pain relievers such as acetaminophen, ibuprofen, and naproxen · Some medications that are also used for depression or seizures · Physical therapy and exercise · Counseling to help patients learn how to cope better with triggers of pain and stress. · Application of heat or cold compress · Massage therapy · Relaxation techniques Be Informed Make sure you know the name of your medication, how much and how often to take it, and its potential risks & side effects. IF YOU ARE PRESCRIBED OPIOIDS FOR PAIN: 
· Never take opioids in greater amounts or more often than prescribed. Remember the goal is not to be pain-free but to manage your pain at a tolerable level. · Follow up with your primary care provider to: · Work together to create a plan on how to manage your pain. · Talk about ways to help manage your pain that don't involve prescription opioids. · Talk about any and all concerns and side effects. · Help prevent misuse and abuse. · Never sell or share prescription opioids · Help prevent misuse and abuse. · Store prescription opioids in a secure place and out of reach of others (this may include visitors, children, friends, and family). · Safely dispose of unused/unwanted prescription opioids: Find your community drug take-back program or your pharmacy mail-back program, or flush them down the toilet, following guidance from the Food and Drug Administration (www.fda.gov/Drugs/ResourcesForYou). · Visit www.cdc.gov/drugoverdose to learn about the risks of opioid abuse and overdose. · If you believe you may be struggling with addiction, tell your health care provider and ask for guidance or call [employer] at 3-059-308-ALXX. Discharge Instructions Hemodialysis Access: What to Expect at [facility] Your Recovery Hemodialysis is a way to remove wastes from the blood when your kidneys can no longer do the job. It is not a cure, but it can help you live longer and feel better. It is a lifesaving treatment when you have kidney failure. Hemodialysis is often called dialysis. Your doctor created a place (called an access) in your arm for your blood to flow in and out of your body during your dialysis sessions. Your arm will probably be bruised and swollen. It may hurt. The cut (incision) may bleed. The pain and bleeding will get better over several days. You will probably need only over-the-counter pain medicine. You can reduce swelling by propping your arm on 1 or 2 pillows and keeping your elbow straight. You will have stitches. These may dissolve on their own, or your doctor will tell you when to come in to have them removed. You should also be able to return to work in a few days. You may feel some coolness or numbness in your hand. These feelings usually go away in a few weeks.  Your doctor may suggest squeezing a soft object. This will strengthen your access and may make hemodialysis faster and easier. You should always be able to feel blood rushing through the fistula or graft. It feels like a slight vibration when you put your fingers on the skin over the fistula or graft. This feeling is called a thrill or pulse. This care sheet gives you a general idea about how long it will take for you to recover. But each person recovers at a different pace. Follow the steps below to get better as quickly as possible. How can you care for yourself at home? Activity 
  · Rest when you feel tired. Getting enough sleep will help you recover. Do not lie on or sleep on the arm with the access.  
  · Avoid activities such as washing windows or gardening that put stress on the arm with the access.  
  · You may use your arm, but do not lift anything that weighs more than about 15 pounds. This may include a child, heavy grocery bags, a heavy briefcase or backpack, cat litter or dog food bags, or a vacuum .  
  · You can shower, but keep the access dry for the first 2 days. Cover the area with a plastic bag to keep it dry.  
  · Do not soak or scrub the incision until it has healed.  
  · Wear an arm guard to protect the area if you play sports or work with your arms.  
  · You may drive when your doctor says it is okay. This is usually in 1 to 2 days.  
  · Most people are able to return to work about 1 or 2 days after surgery. Diet 
  · Follow an eating plan that is good for your kidneys. A registered dietitian can help you make a meal plan that is right for you. You may need to limit protein, salt, fluids, and certain foods. Medicines 
  · Your doctor will tell you if and when you can restart your medicines. He or she will also give you instructions about taking any new medicines.  
  · If you take blood thinners, such as warfarin (Coumadin), clopidogrel (Plavix), or aspirin, be sure to talk to your doctor.  He or she will tell you if and when to start taking those medicines again. Make sure that you understand exactly what your doctor wants you to do.  
  · Take pain medicines exactly as directed. ¨ If the doctor gave you a prescription medicine for pain, take it as prescribed. ¨ If you are not taking a prescription pain medicine, ask your doctor if you can take acetaminophen (Tylenol). Do not take ibuprofen (Advil, Motrin) or naproxen (Aleve), or similar medicines, unless your doctor tells you to. They may make chronic kidney disease worse. ¨ Do not take two or more pain medicines at the same time unless the doctor told you to. Many pain medicines have acetaminophen, which is Tylenol. Too much acetaminophen (Tylenol) can be harmful.  
  · If you think your pain medicine is making you sick to your stomach: 
¨ Take your medicine after meals (unless your doctor has told you not to). ¨ Ask your doctor for a different pain medicine.  
  · If your doctor prescribed antibiotics, take them as directed. Do not stop taking them just because you feel better. You need to take the full course of antibiotics. Incision care 
  · Keep the area dry for 2 days. After 2 days, wash the area with soap and water every day, and always before dialysis.  
  · Do not soak or scrub the incision until it has healed.  
  · If you have a bandage, change it every day or as your doctor recommends. Your doctor will tell you when you can remove it. Exercise 
  · Squeeze a soft ball or other object as your doctor tells you. This will help blood flow through the access and help prevent blood clots.  
 Elevation 
  · Prop up the sore arm on a pillow anytime you sit or lie down during the next 3 days. Try to keep it above the level of your heart. This will help reduce swelling. Other instructions 
  · Every day, check your access for a pulse or thrill in the fistula or graft area. A thrill is a vibration.  To feel a pulse or thrill, place the first two fingers of your hand over the access.  
  · Do not bump your arm.  
  · Do not wear tight clothing, jewelry, or anything else that may squeeze the access.  
  · Use your other arm to have blood drawn or blood pressure taken.  
  · Do not put cream or lotion on or near the access.  
  · Make sure all doctors you deal with know you have a vascular access. Follow-up care is a key part of your treatment and safety. Be sure to make and go to all appointments, and call your doctor if you are having problems. It's also a good idea to know your test results and keep a list of the medicines you take. When should you call for help? Call 911 anytime you think you may need emergency care. For example, call if: 
  · You passed out (lost consciousness).  
  · You have chest pain, are short of breath, or cough up blood.  
 Call your doctor now or seek immediate medical care if: 
  · Your hand or arm is cold or dark-colored.  
  · You have no pulse in your access.  
  · You have nausea or you vomit.  
  · You have pain that does not get better after you take pain medicine.  
  · You have loose stitches, or your incision comes open.  
  · You are bleeding from the incision.  
  · You have signs of infection, such as: 
¨ Increased pain, swelling, warmth, or redness. ¨ Red streaks leading from the area. ¨ Pus draining from the area. ¨ A fever.  
  · You have signs of a blood clot in your leg (called a deep vein thrombosis), such as: 
¨ Pain in your calf, back of the knee, thigh, or groin. ¨ Redness or swelling in your leg.  
 Watch closely for changes in your health, and be sure to contact your doctor if you have any problems. Where can you learn more? Go to http://gary-alexandra.info/. Enter P616 in the search box to learn more about \"Hemodialysis Access: What to Expect at Home. \" Current as of: May 12, 2017 Content Version: 11.7 © 5113-3723 Healthwise, Incorporated. Care instructions adapted under license by [employer] (which disclaims liability or warranty for this information). If you have questions about a medical condition or this instruction, always ask your healthcare professional. [address] any warranty or liability for your use of this information. DISCHARGE SUMMARY from Nurse PATIENT INSTRUCTIONS: 
 
After general anesthesia or intravenous sedation, for 24 hours or while taking prescription Narcotics: · Limit your activities · Do not drive and operate hazardous machinery · Do not make important personal or business decisions · Do  not drink alcoholic beverages · If you have not urinated within 8 hours after discharge, please contact your surgeon on call. Report the following to your surgeon: 
· Excessive pain, swelling, redness or odor of or around the surgical area · Temperature over 100.5 · Nausea and vomiting lasting longer than 4 hours or if unable to take medications · Any signs of decreased circulation or nerve impairment to extremity: change in color, persistent  numbness, tingling, coldness or increase pain · Any questions What to do at Home: 
Recommended activity: Activity as tolerated and no driving for today These are general instructions for a healthy lifestyle: No smoking/ No tobacco products/ Avoid exposure to second hand smoke Surgeon General's Warning:  Quitting smoking now greatly reduces serious risk to your health. Obesity, smoking, and sedentary lifestyle greatly increases your risk for illness A healthy diet, regular physical exercise & weight monitoring are important for maintaining a healthy lifestyle You may be retaining fluid if you have a history of heart failure or if you experience any of the following symptoms:  Weight gain of 3 pounds or more overnight or 5 pounds in a week, increased swelling in our hands or feet or shortness of breath while lying flat in bed. Please call your doctor as soon as you notice any of these symptoms; do not wait until your next office visit. Recognize signs and symptoms of STROKE: 
 
F-face looks uneven A-arms unable to move or move unevenly S-speech slurred or non-existent T-time-call 911 as soon as signs and symptoms begin-DO NOT go Back to bed or wait to see if you get better-TIME IS BRAIN. Warning Signs of HEART ATTACK Call 911 if you have these symptoms: 
? Chest discomfort. Most heart attacks involve discomfort in the center of the chest that lasts more than a few minutes, or that goes away and comes back. It can feel like uncomfortable pressure, squeezing, fullness, or pain. ? Discomfort in other areas of the upper body. Symptoms can include pain or discomfort in one or both arms, the back, neck, jaw, or stomach. ? Shortness of breath with or without chest discomfort. ? Other signs may include breaking out in a cold sweat, nausea, or lightheadedness. Don't wait more than five minutes to call [address]! Fast action can save your life. Calling 911 is almost always the fastest way to get lifesaving treatment. Emergency Medical Services staff can begin treatment when they arrive  up to an hour sooner than if someone gets to the hospital by car. The discharge information has been reviewed with the patient. The patient verbalized understanding. Discharge medications reviewed with the patient and appropriate educational materials and side effects teaching were provided. Patient armband removed and given to patient to take home. Patient was informed of the privacy risks if armband lost or stolen. Introducing [facility]! [employer] introduces [employer] patient portal. Now you can access parts of your medical record, email your doctor's office, and request medication refills online.    
 
1. In your internet browser, go to [email or web address] 2. Click on the First Time User? Click Here link in the Sign In box. You will see the New Member Sign Up page. 3. Enter your [employer] Access Code exactly as it appears below. You will not need to use this code after youve completed the sign-up process. If you do not sign up before the expiration date, you must request a new code. · [employer] Access Code: [email or web address] Expires: 12/24/2018  5:38 PM 
 
4. Enter the last four digits of your Social Security Number (xxxx) and Date of Birth (mm/dd/yyyy) as indicated and click Submit. You will be taken to the next sign-up page. 5. Create a [employer] ID. This will be your Ba[employer]ian login ID and cannot be changed, so think of one that is secure and easy to remember. 6. Create a [employer] password. You can change your password at any time. 7. Enter your Password Reset Question and Answer. This can be used at a later time if you forget your password. 8. Enter your e-mail address. You will receive e-mail notification when new information is available in [address] 9. Click Sign Up. You can now view and download portions of your medical record. 10. Click the Download Summary menu link to download a portable copy of your medical information. If you have questions, please visit the Frequently Asked Questions section of the [employer] website. Remember, [employer] is NOT to be used for urgent needs. For medical emergencies, dial 911. Now available from your iPhone and Android! Introducing Jarett Black As a [facility]elainekristy Blunt patient, I wanted to make you aware of our electronic visit tool called Jarett Black. [facility]elaine Blunt 24/7 allows you to connect within minutes with a medical provider 24 hours a day, seven days a week via a mobile device or tablet or logging into a secure website from your computer. You can access Jarett Black from anywhere in the [place]. A virtual visit might be right for you when you have a simple condition and feel like you just dont want to get out of bed, or cant get away from work for an appointment, when your regular [facility] provider is not available (evenings, weekends or holidays), or when youre out of town and need minor care. Electronic visits cost only $49 and if the [employer] provider determines a prescription is needed to treat your condition, one can be electronically transmitted to a nearby pharmacy*. Please take a moment to enroll today if you have not already done so. The enrollment process is free and takes just a few minutes. To enroll, please download the [email or web address]. [email or web address] to your tablet or phone, or visit [email or web address] to enroll on your computer. And, as an [address] [place] [address] patient with a [employer] account, the results of your visits will be scanned into your electronic medical record and your primary care provider will be able to view the scanned results. We urge you to continue to see your regular [facility] provider for your ongoing medical care. And while your primary care provider may not be the one available when you seek a Jarett Judealma virtual visit, the peace of mind you get from getting a real diagnosis real time can be priceless. For more information on Jarett Black, view our Frequently Asked Questions (FAQs) at [email or web address]. Sincerely, 
 
Leti Mccain MD 
Chief Medical Officer [address] [place] [address] *:  certain medications cannot be prescribed via Jarett Black Providers Seen During Your Hospitalization Provider Specialty Primary office phone Suad Saeed MD Vascular Surgery [phone] Your Primary Care Physician (PCP) Primary Care Physician Office Phone Office Fax Vena Wagner, [address] [facility] [phone] You are allergic to the following Allergen Reactions Contrast Agent (Iodine) Rash Penicillins Hives Recent Documentation Height Weight BMI OB Status Smoking Status 1.549 m 70.5 kg 29.38 kg/m2 Postmenopausal Never Smoker Emergency Contacts Name Discharge Info Relation Home Work Mobile Herve Dooley. DISCHARGE CAREGIVER [3] Spouse [3] [phone] Shaggy Hwang  Sister [23] [phone] FloresSmithDayna DISCHARGE CAREGIVER [3] Daughter [21] [phone] Patient Belongings The following personal items are in your possession at time of discharge: 
  Dental Appliances: None  Visual Aid: None      Home Medications: None   Jewelry: None  Clothing: Shirt, Footwear, Pants, Socks    Other Valuables: Eyeglasses Please provide this summary of care documentation to your next provider. Signatures-by signing, you are acknowledging that this After Visit Summary has been reviewed with you and you have received a copy. Patient Signature:  ____________________________________________________________ Date:  ____________________________________________________________  
  
[facility] [facility] Provider Signature:  ____________________________________________________________ Date:  ____________________________________________________________

## 2018-09-25 NOTE — PERIOP NOTES
Phase 2 Recovery Summary Patient arrived to Phase 2 at 46 Report received from [facility] Vitals:  
 09/25/18 1705 09/25/18 1709 09/25/18 1715 09/25/18 1747 BP: 129/61 130/68 135/66 143/63 Pulse: 77 78 77 73 Resp: 14 19 21 20 Temp: 97.1 °F (36.2 °C) SpO2: 100% 100% 100% 93% Weight:      
Height:      
 
 
oriented to time, place, person and situation Lines and Drains Peripheral Intravenous Line:  
Peripheral IV 09/25/18 Right Hand (Active) Site Assessment Clean, dry, & intact 9/25/2018  5:52 PM  
Phlebitis Assessment 0 9/25/2018  5:52 PM  
Infiltration Assessment 0 9/25/2018  5:52 PM  
Dressing Status Clean, dry, & intact 9/25/2018  5:52 PM  
Dressing Type Tape;Transparent 9/25/2018  5:52 PM  
Hub Color/Line Status Pink; Infusing 9/25/2018  5:52 PM  
 
 
Wound Wound Arm Right (Active) Number of FSEN:7204 Wound Arm Left; Lower (Active) Number of EKWM:2857 Wound Arm Right (Active) Number of AVCP:7164 Wound Arm Left;Mid (Active) Number of YTNP:6367 Wound Arm Left; Lower (Active) Number of days:532 Wound Arm Left (Active) DRESSING STATUS Clean, dry, and intact 9/25/2018  5:52 PM  
DRESSING TYPE Transparent film 9/25/2018  5:52 PM  
Number of days:0 Patient discharged to home with   at [address]

## 2018-09-25 NOTE — H&P
Surgery History and Physical 
 
Subjective:  
 78 y/o female with ESRD on HD TTS, last HD yesterday w/o problems, via L forearm loop AVG - placed 5/2015. Prior RUE AVF occluded. Has had multiple interventions L arm AVG including medial arm pseudoaneurysm resection/primary repair without recurrence. Multiple venous outflow interventions above the antecubital level most recently 8x50mm [place] viabahn AVG w/ 6mm PTA for venous extravasation after angioplasty 6/27/18. Has also had endothrombectomies. Now has increased size of pseudoaneurysm lateral aspect of the loop AVG and presents for resection/repair. Patient Active Problem List  
 Diagnosis Date Noted  PVD (peripheral vascular disease) ([facility] 04/11/2017  ESRD (end stage renal disease) ([facility] 02/11/2014  Seizure disorder ([facility] 02/11/2014  
 HTN (hypertension) 02/11/2014 Past Medical History:  
Diagnosis Date  Arthritis  CAD (coronary artery disease) 2014  
 triple bypass  Chronic kidney disease DIALYSIS M-W-F  Diabetes ([facility] [address] [facility]  Hypertension  Renal dialysis status(V45.11)  S/P CABG x 3 Past Surgical History:  
Procedure Laterality Date  ABDOMEN SURGERY PROC UNLISTED  1976  
 gall stones  CARDIAC SURG PROCEDURE UNLIST  AUG 2014 BYPASS-3 WAY  HX OTHER SURGICAL  1/2015  
 2 cardiac stents  HX VASCULAR ACCESS  4/2014  
 right IJ tunneled Dialysis catheter  HX VASCULAR ACCESS    
 right upper extremitiy fistulagram  
 ROB CATHETER  2/1/2014 Social History Substance Use Topics  Smoking status: Never Smoker  Smokeless tobacco: Never Used  Alcohol use No  
  
History reviewed. No pertinent family history. Prior to Admission medications Medication Sig Start Date End Date Taking? Authorizing Provider  
oxyCODONE-acetaminophen (PERCOCET) 5-325 mg per tablet Take 1 Tab by mouth every four (4) hours as needed for Pain. Max Daily Amount: 6 Tabs.  4/11/17  Yes Alejandra Madison MD  
 atorvastatin (LIPITOR) 10 mg tablet Take 10 mg by mouth daily. Yes Historical Provider  
sevelamer (RENAGEL) 800 mg tablet Take  by mouth three (3) times daily (with meals). Yes Historical Provider  
losartan (COZAAR) 100 mg tablet Take 1 Tab by mouth daily. 14  Yes Lucía Stephenson MD  
aspirin 81 mg chewable tablet Take 81 mg by mouth daily. Yes Historical Provider  
calcium acetate (PHOSLO) 667 mg cap Take 2 Caps by mouth two (2) times a day. Historical Provider Allergies Allergen Reactions  Contrast Agent [Iodine] Rash  Penicillins Hives Review of Systems:  
Gen -no  fever / chills,  Normal appetite. HEENT - denies vision changes, no dysphagia PULM - no cough/dyspnea CV - denies chest pain/palpitations, h/o CABG - asx.+  
GI - no abd pain, no n/v, no diarrhea/constipation, no blood per rectum  - denies dysuria/hematuria/frequency SKIN - no ulcers or dermatitits, L arm scars for AVF/AVG  
MS - +  arthritis. NEURO - denies prior stroke or TIA, + seizure history - none recently, no significant headaches, no focal weakness. Objective:  
 
Visit Vitals  /85  Pulse 69  Temp 97.9 °F (36.6 °C)  Resp 18  Ht 5' 1\" (1.549 m)  Wt 70.5 kg (155 lb 8 oz)  SpO2 98%  BMI 29.38 kg/m2 Temp (24hrs), Av.9 °F (36.6 °C), Min:97.9 °F (36.6 °C), Max:97.9 °F (36.6 °C) Physical Exam: 
GEN: A&Ox3, NAD, comfortable. HEENT:PERRL EOMI, non icteric, moist membranes. NECK: no JVD, R carotid bruit, no LA, no TM 
LUNG: clear b/l and unlabored breathing HEART: regular w/o murmur noted ABD: soft, NT,  NABS  
EXT: No arm edema, L forearm loop AVG with dilated area laterally ~1 1/2 \" long x 3cm diameter, no overlying ulcers, mid loop smaller ~1.5cm pseudoaneurysm, medial loop prior repair pseudoaneurysm w/o recurrence, strong thrill thru out the AVG and medial upper arm mildly pulsatile. Mild ankle edema b/l. PULSES: palp radial / femoral pulses, softly palp pedal pulses. NEURO: no focal lateralizing deficits noted. Motor 5/5. Labs:  
Recent Results (from the past 24 hour(s)) POC 6 PLUS Collection Time: 09/25/18 11:54 AM  
Result Value Ref Range Sodium,  136 - 145 MMOL/L Potassium, POC 4.7 3.5 - 5.5 MMOL/L Chloride, POC 98 (L) 100 - 108 MMOL/L  
 BUN, POC 34 (H) 7 - 18 MG/DL Glucose,  (H) 74 - 106 MG/DL Hematocrit, POC 36 36 - 49 % Hemoglobin, POC 12.2 12 - 16 G/DL Assessment/Plan: 1. ESRD on HD via L Forearm Loop AVG - with lateral aspect pseudoaneurysm degeneration, presents for repair - planned procedure / risks d/w pt including pain, infection, thrombosis, additional procedures, scarring. She agrees to proceed. 2. Mild PVD with prior R TPT intervention with mild sx's - 2016. Last TBI R 0.5, L 0.56. Swetha Julien. [address], [facility] cell - [phone] September 25, 2018 2:05 PM

## 2018-09-25 NOTE — PERIOP NOTES
1705 Pt received to PACU and connected to monitor. Bedside report given by Aurea Garcia RN. Vital signs stable. Nurse at bedside. Will continue to monitor. 1716  No coverage needed. Lab Results Component Value Date/Time Glucose 126 (H) 04/11/2017 10:59 AM  
 Glucose (POC) 135 (H) 09/25/2018 05:16 PM  
 Glucose,  (H) 09/25/2018 11:54 AM  
 
 
 
1718 Dr Jaqueline Germain at bedside to assess pt status. + Thrill and bruit present to KALYAN Soria to update pt's family on current status.

## 2018-09-25 NOTE — DISCHARGE INSTRUCTIONS
Hemodialysis Access: What to Expect at [facility]  Hemodialysis is a way to remove wastes from the blood when your kidneys can no longer do the job. It is not a cure, but it can help you live longer and feel better. It is a lifesaving treatment when you have kidney failure. Hemodialysis is often called dialysis. Your doctor created a place (called an access) in your arm for your blood to flow in and out of your body during your dialysis sessions. Your arm will probably be bruised and swollen. It may hurt. The cut (incision) may bleed. The pain and bleeding will get better over several days. You will probably need only over-the-counter pain medicine. You can reduce swelling by propping your arm on 1 or 2 pillows and keeping your elbow straight. You will have stitches. These may dissolve on their own, or your doctor will tell you when to come in to have them removed. You should also be able to return to work in a few days. You may feel some coolness or numbness in your hand. These feelings usually go away in a few weeks. Your doctor may suggest squeezing a soft object. This will strengthen your access and may make hemodialysis faster and easier. You should always be able to feel blood rushing through the fistula or graft. It feels like a slight vibration when you put your fingers on the skin over the fistula or graft. This feeling is called a thrill or pulse. This care sheet gives you a general idea about how long it will take for you to recover. But each person recovers at a different pace. Follow the steps below to get better as quickly as possible. How can you care for yourself at home? Activity    · Rest when you feel tired. Getting enough sleep will help you recover.  Do not lie on or sleep on the arm with the access.     · Avoid activities such as washing windows or gardening that put stress on the arm with the access.     · You may use your arm, but do not lift anything that weighs more than about 15 pounds. This may include a child, heavy grocery bags, a heavy briefcase or backpack, cat litter or dog food bags, or a vacuum .     · You can shower, but keep the access dry for the first 2 days. Cover the area with a plastic bag to keep it dry.     · Do not soak or scrub the incision until it has healed.     · Wear an arm guard to protect the area if you play sports or work with your arms.     · You may drive when your doctor says it is okay. This is usually in 1 to 2 days.     · Most people are able to return to work about 1 or 2 days after surgery. Diet    · Follow an eating plan that is good for your kidneys. A registered dietitian can help you make a meal plan that is right for you. You may need to limit protein, salt, fluids, and certain foods. Medicines    · Your doctor will tell you if and when you can restart your medicines. He or she will also give you instructions about taking any new medicines.     · If you take blood thinners, such as warfarin (Coumadin), clopidogrel (Plavix), or aspirin, be sure to talk to your doctor. He or she will tell you if and when to start taking those medicines again. Make sure that you understand exactly what your doctor wants you to do.     · Take pain medicines exactly as directed. ¨ If the doctor gave you a prescription medicine for pain, take it as prescribed. ¨ If you are not taking a prescription pain medicine, ask your doctor if you can take acetaminophen (Tylenol). Do not take ibuprofen (Advil, Motrin) or naproxen (Aleve), or similar medicines, unless your doctor tells you to. They may make chronic kidney disease worse. ¨ Do not take two or more pain medicines at the same time unless the doctor told you to. Many pain medicines have acetaminophen, which is Tylenol.  Too much acetaminophen (Tylenol) can be harmful.     · If you think your pain medicine is making you sick to your stomach:  ¨ Take your medicine after meals (unless your doctor has told you not to). ¨ Ask your doctor for a different pain medicine.     · If your doctor prescribed antibiotics, take them as directed. Do not stop taking them just because you feel better. You need to take the full course of antibiotics. Incision care    · Keep the area dry for 2 days. After 2 days, wash the area with soap and water every day, and always before dialysis.     · Do not soak or scrub the incision until it has healed.     · If you have a bandage, change it every day or as your doctor recommends. Your doctor will tell you when you can remove it. Exercise    · Squeeze a soft ball or other object as your doctor tells you. This will help blood flow through the access and help prevent blood clots.    Elevation    · Prop up the sore arm on a pillow anytime you sit or lie down during the next 3 days. Try to keep it above the level of your heart. This will help reduce swelling. Other instructions    · Every day, check your access for a pulse or thrill in the fistula or graft area. A thrill is a vibration. To feel a pulse or thrill, place the first two fingers of your hand over the access.     · Do not bump your arm.     · Do not wear tight clothing, jewelry, or anything else that may squeeze the access.     · Use your other arm to have blood drawn or blood pressure taken.     · Do not put cream or lotion on or near the access.     · Make sure all doctors you deal with know you have a vascular access. Follow-up care is a key part of your treatment and safety. Be sure to make and go to all appointments, and call your doctor if you are having problems. It's also a good idea to know your test results and keep a list of the medicines you take. When should you call for help? Call 911 anytime you think you may need emergency care.  For example, call if:    · You passed out (lost consciousness).     · You have chest pain, are short of breath, or cough up blood.    Call your doctor now or seek immediate medical care if:    · Your hand or arm is cold or dark-colored.     · You have no pulse in your access.     · You have nausea or you vomit.     · You have pain that does not get better after you take pain medicine.     · You have loose stitches, or your incision comes open.     · You are bleeding from the incision.     · You have signs of infection, such as:  ¨ Increased pain, swelling, warmth, or redness. ¨ Red streaks leading from the area. ¨ Pus draining from the area. ¨ A fever.     · You have signs of a blood clot in your leg (called a deep vein thrombosis), such as:  ¨ Pain in your calf, back of the knee, thigh, or groin. ¨ Redness or swelling in your leg.    Watch closely for changes in your health, and be sure to contact your doctor if you have any problems. Where can you learn more? Go to http://gary-alexandra.info/. Enter P616 in the search box to learn more about \"Hemodialysis Access: What to Expect at Home. \"  Current as of: May 12, 2017  Content Version: 11.7  © 3852-5802 [email or web address]. Care instructions adapted under license by [employer] (which disclaims liability or warranty for this information). If you have questions about a medical condition or this instruction, always ask your healthcare professional. [address] any warranty or liability for your use of this information. DISCHARGE SUMMARY from Nurse    PATIENT INSTRUCTIONS:    After general anesthesia or intravenous sedation, for 24 hours or while taking prescription Narcotics:  · Limit your activities  · Do not drive and operate hazardous machinery  · Do not make important personal or business decisions  · Do  not drink alcoholic beverages  · If you have not urinated within 8 hours after discharge, please contact your surgeon on call.     Report the following to your surgeon:  · Excessive pain, swelling, redness or odor of or around the surgical area  · Temperature over 100.5  · Nausea and vomiting lasting longer than 4 hours or if unable to take medications  · Any signs of decreased circulation or nerve impairment to extremity: change in color, persistent  numbness, tingling, coldness or increase pain  · Any questions    What to do at Home:  Recommended activity: Activity as tolerated and no driving for today    These are general instructions for a healthy lifestyle:    No smoking/ No tobacco products/ Avoid exposure to second hand smoke  Surgeon General's Warning:  Quitting smoking now greatly reduces serious risk to your health. Obesity, smoking, and sedentary lifestyle greatly increases your risk for illness    A healthy diet, regular physical exercise & weight monitoring are important for maintaining a healthy lifestyle    You may be retaining fluid if you have a history of heart failure or if you experience any of the following symptoms:  Weight gain of 3 pounds or more overnight or 5 pounds in a week, increased swelling in our hands or feet or shortness of breath while lying flat in bed. Please call your doctor as soon as you notice any of these symptoms; do not wait until your next office visit. Recognize signs and symptoms of STROKE:    F-face looks uneven    A-arms unable to move or move unevenly    S-speech slurred or non-existent    T-time-call 911 as soon as signs and symptoms begin-DO NOT go       Back to bed or wait to see if you get better-TIME IS BRAIN. Warning Signs of HEART ATTACK     Call 911 if you have these symptoms:   Chest discomfort. Most heart attacks involve discomfort in the center of the chest that lasts more than a few minutes, or that goes away and comes back. It can feel like uncomfortable pressure, squeezing, fullness, or pain.  Discomfort in other areas of the upper body. Symptoms can include pain or discomfort in one or both arms, the back, neck, jaw, or stomach.  Shortness of breath with or without chest discomfort.    Other signs may include breaking out in a cold sweat, nausea, or lightheadedness. Don't wait more than five minutes to call 911 - MINUTES MATTER! Fast action can save your life. Calling 911 is almost always the fastest way to get lifesaving treatment. Emergency Medical Services staff can begin treatment when they arrive -- up to an hour sooner than if someone gets to the hospital by car. The discharge information has been reviewed with the patient. The patient verbalized understanding. Discharge medications reviewed with the patient and appropriate educational materials and side effects teaching were provided. Patient armband removed and given to patient to take home. Patient was informed of the privacy risks if armband lost or stolen.

## 2018-09-25 NOTE — ANESTHESIA POSTPROCEDURE EVALUATION
Post-Anesthesia Evaluation and Assessment Patient: Christianne Lu MRN: [id number]  SSN: [id number] YOB: 1940  Age: 77 y.o. Sex: female Cardiovascular Function/Vital Signs Visit Vitals  /66  Pulse 77  Temp 36.2 °C (97.1 °F)  Resp 21  
 Ht 5' 1\" (1.549 m)  Wt 70.5 kg (155 lb 8 oz)  SpO2 100%  BMI 29.38 kg/m2 Patient is status post general anesthesia for Procedure(s): 
left arm ARTERIO VENOUS FISTULA pseudoaneurysm repair. Nausea/Vomiting: None Postoperative hydration reviewed and adequate. Pain: 
Pain Scale 1: Numeric (0 - 10) (09/25/18 1722) Pain Intensity 1: 0 (09/25/18 1722) Managed Neurological Status:  
Neuro (WDL): Within Defined Limits (09/25/18 1705) Neuro RLE Motor Response: Purposeful;Spontaneous  (09/25/18 1705) At baseline Mental Status and Level of Consciousness: Arousable Pulmonary Status:  
O2 Device: Room air (09/25/18 1723) Adequate oxygenation and airway patent Complications related to anesthesia: None Post-anesthesia assessment completed. No concerns Signed By: Laveta Schlatter, MD   
 September 25, 2018

## 2018-09-28 NOTE — OP NOTES
[facility] REPORT    Shannan Hicks  MR#: [id number]  : 1940  ACCOUNT #: [de-identified]   DATE OF SERVICE: 2018    PREOPERATIVE DIAGNOSIS:  End-stage renal disease with left forearm loop arteriovenous graft pseudoaneurysm degeneration. POSTOPERATIVE DIAGNOSIS:  End-stage renal disease with left foreign loop arteriovenous graft pseudoaneurysm degeneration. PROCEDURE PERFORMED:  Left forearm loop arteriovenous graft pseudoaneurysm resection with repair with an interposition Acuseal 7 mm section of graft 2 inches in length with resection of the redundant overlying skin and primary repair. SURGEON:  Pradip Jones MD     :  None. ASSISTANT:  Fatuma Gonzalez    INCISION START:  5216    INCISION CLOSE:  9393    ANESTHESIA:  General via LMA. ESTIMATED BLOOD LOSS:  30 mL    FLUIDS:  250 of saline. PREPROCEDURE ANTIBIOTIC:  Vancomycin 1 G IV.    SPECIMENS REMOVED:  None. FINDINGS:  Removal of the lateral left forearm arteriovenous graft pseudoaneurysm resection approximately 2 inches in length and after primary repair with a 7 mm Acuseal graft, there was a thrill throughout the AV graft and palpable left radial artery. COMPLICATIONS:  None. IMPLANTS:  A [facility] [place] Acuseal 7 mm graft. DESCRIPTION OF PROCEDURE:  After consent was obtained, the patient was taken to the operating room. After satisfactory induction of general anesthesia, the left arm was prepped from the fingers to the axilla with ChloraPrep, allowed to dry and then draped appropriately. The procedure timeout was performed. Transverse incision was made approximately 2 cm in length proximally and distally on either side of the pseudoaneurysm, approximately 2-1/2 inches apart through skin and subcutaneous tissues. We dissected out the graft circumferentially for approximately 2 cm on both ends and doubly encircled with a Vesseloop.   We then dissected along the graft towards the incision on both sides with electrocautery for 1-2 cm. Then, with blunt dissection, we also removed the tissue from the graft. On the distal end, the skin was a highly adherent to the graft. It was unable to be removed from the graft without damaging the skin. Subsequently, it was approximately 1 cm section of skin that was removed at this point and the graft was transected. After clamping with 2 profunda clamps, the patient did receive 4000 units of heparin. The section of graft was removed. At this point, the wound was irrigated. Electrocautery for hemostasis was obtained and the 7 mm Acuseal graft was anastomosed to the arterial end with a [place] suture in a running fashion. We then flushed to make sure there was no clot within this and I irrigated and then cut it to appropriate length and anastomosed it distally again using a running [place] suture. Prior to completing the second anastomosis, we flushed the graft proximally and distally, irrigated with heparinized saline and then completed the anastomosis. We then removed both clamps found to have good hemostasis on the suture line, minor needle hole bleeding. Thrombin and Gelfoam and FloSeal was then placed. At this point, we excised an ellipse of skin approximately 1-1/2 inches in length, 1 cm wide to remove all skin that was stretched and thin over the previous AV graft. We repaired this with a running 3-0 Vicryl suture in the subcutaneous tissues and the skin was closed with 4-0 Monocryl and the 2 transverse incisions. We then irrigated and after hemostasis, we closed the subcutaneous tissue with running 3-0 Vicryl suture, the skin with 4-0 Monocryl in a subcuticular layer. There was fair amount of oozing throughout the case from the skin edges. However, on completion, there was good hemostasis. Dermabond skin adhesive was applied and after drying a thin strip of Telfa was placed over the 3 incisions with Tegaderm dressings.   The patient was extubated in the operating room, taken to recovery room in stable condition where she had a strongly palpable thrill in the fistula, somewhat pulsatile and a palpable radial pulse.       MD DINA Lopez / LINDSAY  D: 09/27/2018 10:34     T: 09/27/2018 21:52  JOB #: [id number]  CC: _____ _____  CC: _____ _____  [facility]  CC: [facility]

## 2018-10-11 ENCOUNTER — ANESTHESIA EVENT (OUTPATIENT)
Dept: SURGERY | Age: 78
End: 2018-10-11
Payer: MEDICARE

## 2018-10-11 ENCOUNTER — HOSPITAL ENCOUNTER (OUTPATIENT)
Age: 78
Setting detail: OUTPATIENT SURGERY
Discharge: HOME | End: 2018-10-11
Attending: SURGERY | Admitting: SURGERY
Payer: MEDICARE

## 2018-10-11 ENCOUNTER — ANESTHESIA (OUTPATIENT)
Dept: SURGERY | Age: 78
End: 2018-10-11
Payer: MEDICARE

## 2018-10-11 VITALS
OXYGEN SATURATION: 96 % | HEIGHT: 61 IN | DIASTOLIC BLOOD PRESSURE: 61 MMHG | BODY MASS INDEX: 29.1 KG/M2 | TEMPERATURE: 97.9 F | WEIGHT: 154.13 LBS | SYSTOLIC BLOOD PRESSURE: 159 MMHG | HEART RATE: 69 BPM | RESPIRATION RATE: 20 BRPM

## 2018-10-11 DIAGNOSIS — N18.6 ESRD (END STAGE RENAL DISEASE) (HCC): Primary | ICD-10-CM

## 2018-10-11 LAB
BUN BLD-MCNC: 29 MG/DL (ref 7–18)
CHLORIDE BLD-SCNC: 97 MMOL/L (ref 100–108)
GLUCOSE BLD STRIP.AUTO-MCNC: 102 MG/DL (ref 70–110)
GLUCOSE BLD STRIP.AUTO-MCNC: 110 MG/DL (ref 74–106)
HCT VFR BLD CALC: 29 % (ref 36–49)
HGB BLD-MCNC: 9.9 G/DL (ref 12–16)
POTASSIUM BLD-SCNC: 5.1 MMOL/L (ref 3.5–5.5)
SODIUM BLD-SCNC: 140 MMOL/L (ref 136–145)

## 2018-10-11 PROCEDURE — 76210000063 HC OR PH I REC FIRST 0.5 HR: Performed by: SURGERY

## 2018-10-11 PROCEDURE — 77030002996 HC SUT SLK J&J -A: Performed by: SURGERY

## 2018-10-11 PROCEDURE — 77030031139 HC SUT VCRL2 J&J -A: Performed by: SURGERY

## 2018-10-11 PROCEDURE — 76010000149 HC OR TIME 1 TO 1.5 HR: Performed by: SURGERY

## 2018-10-11 PROCEDURE — 74011250636 HC RX REV CODE- 250/636: Performed by: SURGERY

## 2018-10-11 PROCEDURE — 77030020256 HC SOL INJ NACL 0.9%  500ML: Performed by: SURGERY

## 2018-10-11 PROCEDURE — 76210000021 HC REC RM PH II 0.5 TO 1 HR: Performed by: SURGERY

## 2018-10-11 PROCEDURE — 77030012510 HC MSK AIRWY LMA TELE -B

## 2018-10-11 PROCEDURE — 74011250636 HC RX REV CODE- 250/636

## 2018-10-11 PROCEDURE — 77030013708 HC HNDPC SUC IRR PULS STRY –B: Performed by: SURGERY

## 2018-10-11 PROCEDURE — 74011000250 HC RX REV CODE- 250

## 2018-10-11 PROCEDURE — 76060000033 HC ANESTHESIA 1 TO 1.5 HR: Performed by: SURGERY

## 2018-10-11 PROCEDURE — 84295 ASSAY OF SERUM SODIUM: CPT

## 2018-10-11 PROCEDURE — 77030002987 HC SUT PROL J&J -B: Performed by: SURGERY

## 2018-10-11 PROCEDURE — 77030013079 HC BLNKT BAIR HGGR 3M -A

## 2018-10-11 PROCEDURE — 77030002933 HC SUT MCRYL J&J -A: Performed by: SURGERY

## 2018-10-11 PROCEDURE — 77030011267 HC ELECTRD BLD COVD -A: Performed by: SURGERY

## 2018-10-11 PROCEDURE — 82962 GLUCOSE BLOOD TEST: CPT

## 2018-10-11 PROCEDURE — 77030002916 HC SUT ETHLN J&J -A: Performed by: SURGERY

## 2018-10-11 PROCEDURE — 77030018836 HC SOL IRR NACL ICUM -A: Performed by: SURGERY

## 2018-10-11 PROCEDURE — 74011000272 HC RX REV CODE- 272: Performed by: SURGERY

## 2018-10-11 PROCEDURE — 77030002924 HC SUT GORTX WLGO -B: Performed by: SURGERY

## 2018-10-11 PROCEDURE — 77030032490 HC SLV COMPR SCD KNE COVD -B: Performed by: SURGERY

## 2018-10-11 PROCEDURE — 74011000250 HC RX REV CODE- 250: Performed by: SURGERY

## 2018-10-11 PROCEDURE — 77030014647 HC SEAL FBRN TISSL BAXT -D: Performed by: SURGERY

## 2018-10-11 RX ORDER — LIDOCAINE HYDROCHLORIDE 20 MG/ML
INJECTION, SOLUTION EPIDURAL; INFILTRATION; INTRACAUDAL; PERINEURAL AS NEEDED
Status: DISCONTINUED | OUTPATIENT
Start: 2018-10-11 | End: 2018-10-11 | Stop reason: HOSPADM

## 2018-10-11 RX ORDER — FENTANYL CITRATE 50 UG/ML
50 INJECTION, SOLUTION INTRAMUSCULAR; INTRAVENOUS
Status: DISCONTINUED | OUTPATIENT
Start: 2018-10-11 | End: 2018-10-11 | Stop reason: HOSPADM

## 2018-10-11 RX ORDER — INSULIN LISPRO 100 [IU]/ML
INJECTION, SOLUTION INTRAVENOUS; SUBCUTANEOUS AS NEEDED
Status: DISCONTINUED | OUTPATIENT
Start: 2018-10-11 | End: 2018-10-11 | Stop reason: HOSPADM

## 2018-10-11 RX ORDER — SODIUM CHLORIDE 9 MG/ML
25 INJECTION, SOLUTION INTRAVENOUS CONTINUOUS
Status: DISCONTINUED | OUTPATIENT
Start: 2018-10-11 | End: 2018-10-11 | Stop reason: HOSPADM

## 2018-10-11 RX ORDER — SODIUM CHLORIDE 9 MG/ML
INJECTION, SOLUTION INTRAVENOUS
Status: DISCONTINUED | OUTPATIENT
Start: 2018-10-11 | End: 2018-10-11 | Stop reason: HOSPADM

## 2018-10-11 RX ORDER — EPHEDRINE SULFATE 50 MG/ML
INJECTION, SOLUTION INTRAVENOUS AS NEEDED
Status: DISCONTINUED | OUTPATIENT
Start: 2018-10-11 | End: 2018-10-11 | Stop reason: HOSPADM

## 2018-10-11 RX ORDER — HYDROCODONE BITARTRATE AND ACETAMINOPHEN 5; 325 MG/1; MG/1
1 TABLET ORAL
Qty: 30 TAB | Refills: 0 | Status: SHIPPED | OUTPATIENT
Start: 2018-10-11

## 2018-10-11 RX ORDER — CLINDAMYCIN PHOSPHATE 900 MG/50ML
INJECTION INTRAVENOUS AS NEEDED
Status: DISCONTINUED | OUTPATIENT
Start: 2018-10-11 | End: 2018-10-11 | Stop reason: HOSPADM

## 2018-10-11 RX ORDER — FAMOTIDINE 20 MG/1
20 TABLET, FILM COATED ORAL ONCE
Status: DISCONTINUED | OUTPATIENT
Start: 2018-10-11 | End: 2018-10-11 | Stop reason: HOSPADM

## 2018-10-11 RX ORDER — PROPOFOL 10 MG/ML
INJECTION, EMULSION INTRAVENOUS AS NEEDED
Status: DISCONTINUED | OUTPATIENT
Start: 2018-10-11 | End: 2018-10-11 | Stop reason: HOSPADM

## 2018-10-11 RX ORDER — MAGNESIUM SULFATE 100 %
4 CRYSTALS MISCELLANEOUS AS NEEDED
Status: DISCONTINUED | OUTPATIENT
Start: 2018-10-11 | End: 2018-10-11 | Stop reason: HOSPADM

## 2018-10-11 RX ORDER — ONDANSETRON 2 MG/ML
INJECTION INTRAMUSCULAR; INTRAVENOUS AS NEEDED
Status: DISCONTINUED | OUTPATIENT
Start: 2018-10-11 | End: 2018-10-11 | Stop reason: HOSPADM

## 2018-10-11 RX ORDER — SODIUM CHLORIDE, SODIUM LACTATE, POTASSIUM CHLORIDE, CALCIUM CHLORIDE 600; 310; 30; 20 MG/100ML; MG/100ML; MG/100ML; MG/100ML
25 INJECTION, SOLUTION INTRAVENOUS CONTINUOUS
Status: DISCONTINUED | OUTPATIENT
Start: 2018-10-11 | End: 2018-10-11 | Stop reason: HOSPADM

## 2018-10-11 RX ORDER — FENTANYL CITRATE 50 UG/ML
INJECTION, SOLUTION INTRAMUSCULAR; INTRAVENOUS AS NEEDED
Status: DISCONTINUED | OUTPATIENT
Start: 2018-10-11 | End: 2018-10-11 | Stop reason: HOSPADM

## 2018-10-11 RX ORDER — CLINDAMYCIN PHOSPHATE 900 MG/50ML
INJECTION INTRAVENOUS
Status: COMPLETED
Start: 2018-10-11 | End: 2018-10-11

## 2018-10-11 RX ORDER — OXYCODONE AND ACETAMINOPHEN 5; 325 MG/1; MG/1
1 TABLET ORAL AS NEEDED
Status: DISCONTINUED | OUTPATIENT
Start: 2018-10-11 | End: 2018-10-11 | Stop reason: HOSPADM

## 2018-10-11 RX ORDER — DEXTROSE 50 % IN WATER (D50W) INTRAVENOUS SYRINGE
25-50 AS NEEDED
Status: DISCONTINUED | OUTPATIENT
Start: 2018-10-11 | End: 2018-10-11 | Stop reason: HOSPADM

## 2018-10-11 RX ORDER — FENTANYL CITRATE 50 UG/ML
25 INJECTION, SOLUTION INTRAMUSCULAR; INTRAVENOUS AS NEEDED
Status: DISCONTINUED | OUTPATIENT
Start: 2018-10-11 | End: 2018-10-11 | Stop reason: HOSPADM

## 2018-10-11 RX ADMIN — PROPOFOL 200 MG: 10 INJECTION, EMULSION INTRAVENOUS at 14:59

## 2018-10-11 RX ADMIN — EPHEDRINE SULFATE 5 MG: 50 INJECTION, SOLUTION INTRAVENOUS at 15:41

## 2018-10-11 RX ADMIN — CLINDAMYCIN PHOSPHATE 900 MG: 900 INJECTION INTRAVENOUS at 15:21

## 2018-10-11 RX ADMIN — ONDANSETRON 4 MG: 2 INJECTION INTRAMUSCULAR; INTRAVENOUS at 15:27

## 2018-10-11 RX ADMIN — FENTANYL CITRATE 25 MCG: 50 INJECTION, SOLUTION INTRAMUSCULAR; INTRAVENOUS at 15:13

## 2018-10-11 RX ADMIN — FENTANYL CITRATE 25 MCG: 50 INJECTION, SOLUTION INTRAMUSCULAR; INTRAVENOUS at 15:43

## 2018-10-11 RX ADMIN — EPHEDRINE SULFATE 5 MG: 50 INJECTION, SOLUTION INTRAVENOUS at 15:37

## 2018-10-11 RX ADMIN — SODIUM CHLORIDE: 9 INJECTION, SOLUTION INTRAVENOUS at 14:56

## 2018-10-11 RX ADMIN — FENTANYL CITRATE 25 MCG: 50 INJECTION, SOLUTION INTRAMUSCULAR; INTRAVENOUS at 14:59

## 2018-10-11 RX ADMIN — LIDOCAINE HYDROCHLORIDE 40 MG: 20 INJECTION, SOLUTION EPIDURAL; INFILTRATION; INTRACAUDAL; PERINEURAL at 14:59

## 2018-10-11 NOTE — PERIOP NOTES
Phase 2 Recovery Summary  Patient arrived to Phase 2 at 1629. Report received from [facility] VS stable. Patient denies any pain at this time. I felt and heard a positive bruit and thrill. Vitals:    10/11/18 1620 10/11/18 1625 10/11/18 1627 10/11/18 1637   BP: 167/67 154/62  159/61   Pulse: 70 71 70 69   Resp: 20 22 21 20   Temp:       SpO2: 96%  95% 96%   Weight:       Height:           oriented to time, place, person and situation    Lines and Drains  Peripheral Intravenous Line:      Wound  Wound Arm Right (Active)   Number of FUWI:[id number]       Wound Arm Left; Lower (Active)   Number of days:1248       Wound Arm Right (Active)   Number of days:1248       Wound Arm Left;Mid (Active)   Number of days:1248       Wound Arm Left; Lower (Active)   Number of days:548       Wound Arm Left (Active)   Number of days:16       Wound Arm Left (Active)   DRESSING STATUS Clean, dry, and intact 10/11/2018  4:56 PM   DRESSING TYPE 4 x 4;Non-adherent;Transparent film 10/11/2018  4:56 PM   Incision site well approximated? Yes 10/11/2018  4:00 PM   Number of days:0              Patient discharged to 1710. Discharge discussed with  and patient. No questions or concerns at this time.    Francisco Lee RN

## 2018-10-11 NOTE — H&P (VIEW-ONLY)
Surgery History and Physical 
 
Subjective:  
 76 y/o female with ESRD on HD TTS, last HD yesterday w/o problems, via L forearm loop AVG - placed 5/2015. Prior RUE AVF occluded. Has had multiple interventions L arm AVG including medial arm pseudoaneurysm resection/primary repair without recurrence. Multiple venous outflow interventions above the antecubital level most recently 8x50mm [place] viabahn AVG w/ 6mm PTA for venous extravasation after angioplasty 6/27/18. Has also had endothrombectomies. Now has increased size of pseudoaneurysm lateral aspect of the loop AVG and presents for resection/repair. Patient Active Problem List  
 Diagnosis Date Noted  PVD (peripheral vascular disease) ([facility] 04/11/2017  ESRD (end stage renal disease) ([facility] 02/11/2014  Seizure disorder ([facility] 02/11/2014  
 HTN (hypertension) 02/11/2014 Past Medical History:  
Diagnosis Date  Arthritis  CAD (coronary artery disease) 2014  
 triple bypass  Chronic kidney disease DIALYSIS M-W-F  Diabetes ([facility]  Hypertension  Renal dialysis status(V45.11)  S/P CABG x 3 Past Surgical History:  
Procedure Laterality Date  ABDOMEN SURGERY PROC UNLISTED  1976  
 gall stones  CARDIAC SURG PROCEDURE UNLIST  AUG 2014 BYPASS-3 WAY  HX OTHER SURGICAL  1/2015  
 2 cardiac stents  HX VASCULAR ACCESS  4/2014  
 right IJ tunneled Dialysis catheter  HX VASCULAR ACCESS    
 right upper extremitiy fistulagram  
 ROB CATHETER  2/1/2014 Social History Substance Use Topics  Smoking status: Never Smoker  Smokeless tobacco: Never Used  Alcohol use No  
  
History reviewed. No pertinent family history. Prior to Admission medications Medication Sig Start Date End Date Taking? Authorizing Provider  
oxyCODONE-acetaminophen (PERCOCET) 5-325 mg per tablet Take 1 Tab by mouth every four (4) hours as needed for Pain. Max Daily Amount: 6 Tabs.  4/11/17  Yes Pollo Whatley MD  
 atorvastatin (LIPITOR) 10 mg tablet Take 10 mg by mouth daily. Yes Historical Provider  
sevelamer (RENAGEL) 800 mg tablet Take  by mouth three (3) times daily (with meals). Yes Historical Provider  
losartan (COZAAR) 100 mg tablet Take 1 Tab by mouth daily. 14  Yes Darryl Awad MD  
aspirin 81 mg chewable tablet Take 81 mg by mouth daily. Yes Historical Provider  
calcium acetate (PHOSLO) 667 mg cap Take 2 Caps by mouth two (2) times a day. Historical Provider Allergies Allergen Reactions  Contrast Agent [Iodine] Rash  Penicillins Hives Review of Systems:  
Gen -no  fever / chills,  Normal appetite. HEENT - denies vision changes, no dysphagia PULM - no cough/dyspnea CV - denies chest pain/palpitations, h/o CABG - asx.+  
GI - no abd pain, no n/v, no diarrhea/constipation, no blood per rectum  - denies dysuria/hematuria/frequency SKIN - no ulcers or dermatitits, L arm scars for AVF/AVG  
MS - +  arthritis. NEURO - denies prior stroke or TIA, + seizure history - none recently, no significant headaches, no focal weakness. Objective:  
 
Visit Vitals  /85  Pulse 69  Temp 97.9 °F (36.6 °C)  Resp 18  Ht 5' 1\" (1.549 m)  Wt 70.5 kg (155 lb 8 oz)  SpO2 98%  BMI 29.38 kg/m2 Temp (24hrs), Av.9 °F (36.6 °C), Min:97.9 °F (36.6 °C), Max:97.9 °F (36.6 °C) Physical Exam: 
GEN: A&Ox3, NAD, comfortable. HEENT:PERRL EOMI, non icteric, moist membranes. NECK: no JVD, R carotid bruit, no LA, no TM 
LUNG: clear b/l and unlabored breathing HEART: regular w/o murmur noted ABD: soft, NT,  NABS  
EXT: No arm edema, L forearm loop AVG with dilated area laterally ~1 1/2 \" long x 3cm diameter, no overlying ulcers, mid loop smaller ~1.5cm pseudoaneurysm, medial loop prior repair pseudoaneurysm w/o recurrence, strong thrill thru out the AVG and medial upper arm mildly pulsatile. Mild ankle edema b/l. PULSES: palp radial / femoral pulses, softly palp pedal pulses. NEURO: no focal lateralizing deficits noted. Motor 5/5. Labs:  
Recent Results (from the past 24 hour(s)) POC 6 PLUS Collection Time: 09/25/18 11:54 AM  
Result Value Ref Range Sodium,  136 - 145 MMOL/L Potassium, POC 4.7 3.5 - 5.5 MMOL/L Chloride, POC 98 (L) 100 - 108 MMOL/L  
 BUN, POC 34 (H) 7 - 18 MG/DL Glucose,  (H) 74 - 106 MG/DL Hematocrit, POC 36 36 - 49 % Hemoglobin, POC 12.2 12 - 16 G/DL Assessment/Plan: 1. ESRD on HD via L Forearm Loop AVG - with lateral aspect pseudoaneurysm degeneration, presents for repair - planned procedure / risks d/w pt including pain, infection, thrombosis, additional procedures, scarring. She agrees to proceed. 2. Mild PVD with prior R TPT intervention with mild sx's - 2016. Last TBI R 0.5, L 0.56. Florinda Soto. [address], [address] [facility] - [phone] September 25, 2018 2:05 PM

## 2018-10-11 NOTE — PERIOP NOTES
Recd care of pt from OR via stretcher. Resp even and labored. Attached to monitor. VSS. OR, MAR and anesthesia report acknowledged. Will cont to monitor.

## 2018-10-11 NOTE — BRIEF OP NOTE
BRIEF OPERATIVE NOTE    Date of Procedure: 10/11/2018   Preoperative Diagnosis: left forearm loop AVG revision wound hematoma partial separation  Postoperative Diagnosis: same   Procedure(s):  LEFT FOREARM LOOP AVG LATERAL WOUND EXPLORATION, REMOVAL OF HEMATOMA, PULSE LAVAGE, AND PRIMARY CLOSURE  Surgeon(s) and Role:     * Anai Leach MD - Primary         Surgical Assistant:NONE    Surgical Staff:  Circ-1: Juan Soto RN  Scrub Tech-1: Kendall Johnson  Surg Asst-1: Elana Gamez  Event Time In   Incision Start 1521   Incision Close 1555     Anesthesia: General / LMA  Estimated Blood Loss: 5ML  FLUIDS: 200ML SALINE  PRE PROCEDURE ANTIBIOTIC: CLINDAMYCIN 900ML  PULSE LAVAGE 3L SALINE WITH BACITRACIN / INTRAOPERATIVE WOUND APPLICATION OF DRY RIFAMPIN ~300ML  Specimens: NONE  Findings:  OLD HEMATOMA WITHOUT EVIDENCE OF INFECTION, COMPLETELY REMOVED WITH PULSE LAVAGE, NO AREAS OF BLEEDING NOTED, WOUND CLOSED WITH INTERRUPTED 2-0 NYLON MATTRESS SUTURES, AT COMPLETION STRONG THRILL / PULSATILE UNCHANGED FROM PRE OPERATIVE EXAM.  Complications: NONE  Implants: NONE

## 2018-10-11 NOTE — ANESTHESIA PREPROCEDURE EVALUATION
Anesthetic History No history of anesthetic complications Review of Systems / Medical History Patient summary reviewed and pertinent labs reviewed Pulmonary Within defined limits Neuro/Psych  
 
seizures: well controlled Cardiovascular Hypertension CAD Exercise tolerance: <4 METS 
  
GI/Hepatic/Renal 
  
 
 
Renal disease: ESRD and dialysis Endo/Other Diabetes Arthritis Other Findings Physical Exam 
 
Airway Mallampati: II 
TM Distance: 4 - 6 cm Neck ROM: normal range of motion Mouth opening: Normal 
 
 Cardiovascular Regular rate and rhythm,  S1 and S2 normal,  no murmur, click, rub, or gallop Dental 
 
Dentition: Poor dentition Pulmonary Breath sounds clear to auscultation Abdominal 
GI exam deferred Other Findings Anesthetic Plan ASA: 4 Anesthesia type: general 
 
 
 
 
Induction: Intravenous Anesthetic plan and risks discussed with: Patient and Family

## 2018-10-11 NOTE — IP AVS SNAPSHOT
[address] [place] [address] Magaly Nichomichael Rogers 
[phone] Patient: Ana Galdamez MRN: [id number] ANA:[birth year] About your hospitalization You were admitted on:  October 11, 2018 You last received care in the:  [facility] PHASE 2 RECOVERY You were discharged on:  October 11, 2018 Why you were hospitalized Your primary diagnosis was:  Not on File Follow-up Information Follow up With Details Comments Contact Info Jewel Persaud MD   [address] [place] [address] 
[phone] Discharge Orders None A check anaya indicates which time of day the medication should be taken. My Medications CONTINUE taking these medications Instructions Each Dose to Equal  
 Morning Noon Evening Bedtime  
 aspirin 81 mg chewable tablet Your last dose was: Your next dose is: Take 81 mg by mouth daily. 81 mg  
    
   
   
   
  
 atorvastatin 10 mg tablet Commonly known as:  LIPITOR Your last dose was: Your next dose is: Take 10 mg by mouth daily. 10 mg  
    
   
   
   
  
 calcium acetate 667 mg Cap Commonly known as:  PHOSLO Your last dose was: Your next dose is: Take 2 Caps by mouth two (2) times a day. 2 Cap HYDROcodone-acetaminophen 5-325 mg per tablet Commonly known as:  Almeta Reasoner Your last dose was: Your next dose is: Take 1 Tab by mouth every four (4) hours as needed for Pain. Max Daily Amount: 6 Tabs. 1 Tab  
    
   
   
   
  
 losartan 100 mg tablet Commonly known as:  COZAAR Your last dose was: Your next dose is: Take 1 Tab by mouth daily. 100 mg  
    
   
   
   
  
 sevelamer 800 mg tablet Commonly known as:  RENAGEL Your last dose was: Your next dose is: Take  by mouth three (3) times daily (with meals). Where to Get Your Medications Information on where to get these meds will be given to you by the nurse or doctor. ! Ask your nurse or doctor about these medications HYDROcodone-acetaminophen 5-325 mg per tablet Opioid Education Prescription Opioids: What You Need to Know: 
 
 
ACTIVITY: 
Limited activity today. Keep access arm straight and raised above the level of your heart for 24 hours or until the swelling goes away. DIET: 
May have your usual food, unless told otherwise by your doctor. PAIN: 
Use the prescription for pain medicine your doctor gave you. If you do not have one, usual your normal pain medicine. If this does not control your pain, call your doctor. DO NOT TAKE ASPIRIN OR MEDICINE WITH ASPIRIN IN IT, unless your doctor says you may. DRESSING CARE:  
Keep your dressing clean and dry. Leave your dressing on until the Dialysis Nurse or your doctor takes it off. BLEEDING: If you have any bleeding put pressure over the bleeding area and call your doctor. CARE OF YOUR ACCESS ARM: 
You may have some bruising, swelling, and discoloration for several weeks. After the swelling has gone down, you will be able to see the outline of the graft. By placing your fingertips over the graft you will be able to feel a vibration (thrill) that means blood is flowing and the graft is working.  
 
DO: 
 1. Make sure your arm is washed with soap and water every day. 2. Feel for the [address] at area marked in the picture. 3. Call your Kidney doctor if you do not feel the [address] or for any problems like swelling, redness, tingling, or numbness in access arm, pus, or fever over 101. DO NOT: 
1. Wear tight sleeves, watches, belts, or bracelets over the graft. 2. Carry heavy bags across the graft or fistula. 3. Sleep on your graft side. 4. Let your blood pressure be taken in your access arm. 5. Let blood be drawn from your access arm. For the next 24 hours, DO NOT Drive, Drink Alcoholic Beverages, or Make IMPORTANT Decisions. Follow-Up Instructions:  Please see your ordering doctor as he/she has requested. To Reach Us:   
 
 
DISCHARGE SUMMARY from Nurse PATIENT INSTRUCTIONS: 
 
After general anesthesia or intravenous sedation, for 24 hours or while taking prescription Narcotics: · Limit your activities · Do not drive and operate hazardous machinery · Do not make important personal or business decisions · Do  not drink alcoholic beverages · If you have not urinated within 8 hours after discharge, please contact your surgeon on call. Report the following to your surgeon: 
· Excessive pain, swelling, redness or odor of or around the surgical area · Temperature over 100.5 · Nausea and vomiting lasting longer than 4 hours or if unable to take medications · Any signs of decreased circulation or nerve impairment to extremity: change in color, persistent  numbness, tingling, coldness or increase pain · Any questions What to do at Home: 
Recommended activity: Activity as tolerated and no driving for todayThese are general instructions for a healthy lifestyle: No smoking/ No tobacco products/ Avoid exposure to second hand smoke Surgeon General's Warning:  Quitting smoking now greatly reduces serious risk to your health. Obesity, smoking, and sedentary lifestyle greatly increases your risk for illness A healthy diet, regular physical exercise & weight monitoring are important for maintaining a healthy lifestyle You may be retaining fluid if you have a history of heart failure or if you experience any of the following symptoms:  Weight gain of 3 pounds or more overnight or 5 pounds in a week, increased swelling in our hands or feet or shortness of breath while lying flat in bed. Please call your doctor as soon as you notice any of these symptoms; do not wait until your next office visit. Recognize signs and symptoms of STROKE: 
 
F-face looks uneven A-arms unable to move or move unevenly S-speech slurred or non-existent T-time-call 911 as soon as signs and symptoms begin-DO NOT go Back to bed or wait to see if you get better-TIME IS BRAIN. Warning Signs of HEART ATTACK Call 911 if you have these symptoms: 
? Chest discomfort. Most heart attacks involve discomfort in the center of the chest that lasts more than a few minutes, or that goes away and comes back. It can feel like uncomfortable pressure, squeezing, fullness, or pain. ? Discomfort in other areas of the upper body. Symptoms can include pain or discomfort in one or both arms, the back, neck, jaw, or stomach. ? Shortness of breath with or without chest discomfort. ? Other signs may include breaking out in a cold sweat, nausea, or lightheadedness. Don't wait more than five minutes to call [address]! Fast action can save your life. Calling 911 is almost always the fastest way to get lifesaving treatment. Emergency Medical Services staff can begin treatment when they arrive  up to an hour sooner than if someone gets to the hospital by car. The discharge information has been reviewed with the patient. The patient verbalized understanding.  
Discharge medications reviewed with the patient and appropriate educational materials and side effects teaching were provided. ___________________________________________________________________________________________________________________________________ .,armband Introducing \[facility] [place] [facility]\"" & HEALTH SERVICES! [employer] introduces EpiVax patient portal. Now you can access parts of your medical record, email your doctor's office, and request medication refills online. 1. In your internet browser, go to [email or web address] 2. Click on the First Time User? Click Here link in the Sign In box. You will see the New Member Sign Up page. 3. Enter your EpiVax Access Code exactly as it appears below. You will not need to use this code after youve completed the sign-up process. If you do not sign up before the expiration date, you must request a new code. · EpiVax Access Code: [email or web address] Expires: 12/24/2018  5:38 PM 
 
4. Enter the last four digits of your Social Security Number (xxxx) and Date of Birth (mm/dd/yyyy) as indicated and click Submit. You will be taken to the next sign-up page. 5. Create a Ak?Lext ID. This will be your EpiVax login ID and cannot be changed, so think of one that is secure and easy to remember. 6. Create a Ak[employer]Lext password. You can change your password at any time. 7. Enter your Password Reset Question and Answer. This can be used at a later time if you forget your password. 8. Enter your e-mail address. You will receive e-mail notification when new information is available in [address] 9. Click Sign Up. You can now view and download portions of your medical record. 10. Click the Download Summary menu link to download a portable copy of your medical information. If you have questions, please visit the Frequently Asked Questions section of the EpiVax website. Remember, EpiVax is NOT to be used for urgent needs. For medical emergencies, dial 911. Now available from your iPhone and Android! Introducing Jarett Black As a [facility] patient, I wanted to make you aware of our electronic visit tool called Jarett Black. [employer] allows you to connect within minutes with a medical provider 24 hours a day, seven days a week via a mobile device or tablet or logging into a secure website from your computer. You can access Jarett Black from anywhere in the [place]. A virtual visit might be right for you when you have a simple condition and feel like you just dont want to get out of bed, or cant get away from work for an appointment, when your regular [facility] provider is not available (evenings, weekends or holidays), or when youre out of town and need minor care. Electronic visits cost only $49 and if the Impressto/FREECULTR provider determines a prescription is needed to treat your condition, one can be electronically transmitted to a nearby pharmacy*. Please take a moment to enroll today if you have not already done so. The enrollment process is free and takes just a few minutes. To enroll, please download the [employer] angie to your tablet or phone, or visit [email or web address] to enroll on your computer. And, as an [address] [place] [address] patient with a y prime account, the results of your visits will be scanned into your electronic medical record and your primary care provider will be able to view the scanned results. We urge you to continue to see your regular [facility] provider for your ongoing medical care. And while your primary care provider may not be the one available when you seek a Jarett Black virtual visit, the peace of mind you get from getting a real diagnosis real time can be priceless. For more information on Jarett Black, view our Frequently Asked Questions (FAQs) at [email or web address]. Sincerely, 
 
Emmanuelle De La Cruz MD 
Chief Medical Officer Laura Marshall *:  certain medications cannot be prescribed via Jarett Black Providers Seen During Your Hospitalization Provider Specialty Primary office phone Ariana Stone MD Vascular Surgery [phone] Your Primary Care Physician (PCP) Primary Care Physician Office Phone Office Fax Ted Fierro, [address] [facility] [phone] You are allergic to the following Allergen Reactions Contrast Agent (Iodine) Rash Penicillins Hives Recent Documentation Height Weight BMI OB Status Smoking Status 1.549 m 69.9 kg 29.12 kg/m2 Postmenopausal Never Smoker Emergency Contacts Name Discharge Info Relation Home Work Mobile [employer] DISCHARGE CAREGIVER [3] Spouse [3] [phone] [facility] CARE[facility]IVER [3] Sister [23] [phone] FloresDayna cortes DISCHARGE CAREGIVER [3] Daughter [21] [phone] Patient Belongings The following personal items are in your possession at time of discharge: 
  Dental Appliances: None         Home Medications: None   Jewelry: None  Clothing: Pants, Shirt, Footwear, Undergarments    Other Valuables: Eyeglasses Please provide this summary of care documentation to your next provider. Signatures-by signing, you are acknowledging that this After Visit Summary has been reviewed with you and you have received a copy. Patient Signature:  ____________________________________________________________ Date:  ____________________________________________________________  
  
Lawerance Pool Provider Signature:  ____________________________________________________________ Date:  ____________________________________________________________

## 2018-10-11 NOTE — ANESTHESIA POSTPROCEDURE EVALUATION
Post-Anesthesia Evaluation & Assessment Visit Vitals  /61 (BP 1 Location: Right arm, BP Patient Position: At rest)  Pulse 69  Temp 36.7 °C (98 °F)  Resp 20  
 Ht 5' 1\" (1.549 m)  Wt 69.9 kg (154 lb 2 oz)  SpO2 96%  BMI 29.12 kg/m2 Post-operative hydration adequate. Pain score (VAS): 0 Pain Scale 1: Numeric (0 - 10) (10/11/18 1652) Pain Intensity 1: 0 (10/11/18 1652) Managed. Mental status & Level of consciousness: returned to baseline Neurological status: returned to baseline Pulmonary status: airway patent, oxygen given as needed. Complications related to anesthesia: none Patient has met all discharge requirements. Additional comments: 
 
 
 
Blossom Villalobos MD 
October 11, 2018

## 2018-10-11 NOTE — DISCHARGE INSTRUCTIONS
[address]  Department of Interventional Radiology  [phone] Office  [phone] Fax       ARTERIOVENOUS FISTULA, GRAFT ACCESS, REVISION, OR DECLOT    ACTIVITY:  Limited activity today. Keep access arm straight and raised above the level of your heart for 24 hours or until the swelling goes away. DIET:  May have your usual food, unless told otherwise by your doctor. PAIN:  Use the prescription for pain medicine your doctor gave you. If you do not have one, usual your normal pain medicine. If this does not control your pain, call your doctor. DO NOT TAKE ASPIRIN OR MEDICINE WITH ASPIRIN IN IT, unless your doctor says you may. DRESSING CARE:   Keep your dressing clean and dry. Leave your dressing on until the Dialysis Nurse or your doctor takes it off. BLEEDING:  If you have any bleeding put pressure over the bleeding area and call your doctor. CARE OF YOUR ACCESS ARM:  You may have some bruising, swelling, and discoloration for several weeks. After the swelling has gone down, you will be able to see the outline of the graft. By placing your fingertips over the graft you will be able to feel a vibration (thrill) that means blood is flowing and the graft is working. DO:  1. Make sure your arm is washed with soap and water every day. 2. Feel for the [address] at area marked in the picture. 3. Call your Kidney doctor if you do not feel the [address] or for any problems like swelling, redness, tingling, or numbness in access arm, pus, or fever over 101. DO NOT:  1. Wear tight sleeves, watches, belts, or bracelets over the graft. 2. Carry heavy bags across the graft or fistula. 3. Sleep on your graft side. 4. Let your blood pressure be taken in your access arm. 5. Let blood be drawn from your access arm. For the next 24 hours, DO NOT Drive, Drink Alcoholic Beverages, or Make IMPORTANT Decisions.     Follow-Up Instructions:  Please see your ordering doctor as he/she has requested. To Reach Us:        DISCHARGE SUMMARY from Nurse    PATIENT INSTRUCTIONS:    After general anesthesia or intravenous sedation, for 24 hours or while taking prescription Narcotics:  · Limit your activities  · Do not drive and operate hazardous machinery  · Do not make important personal or business decisions  · Do  not drink alcoholic beverages  · If you have not urinated within 8 hours after discharge, please contact your surgeon on call. Report the following to your surgeon:  · Excessive pain, swelling, redness or odor of or around the surgical area  · Temperature over 100.5  · Nausea and vomiting lasting longer than 4 hours or if unable to take medications  · Any signs of decreased circulation or nerve impairment to extremity: change in color, persistent  numbness, tingling, coldness or increase pain  · Any questions    What to do at Home:  Recommended activity: Activity as tolerated and no driving for todayThese are general instructions for a healthy lifestyle:    No smoking/ No tobacco products/ Avoid exposure to second hand smoke  Surgeon General's Warning:  Quitting smoking now greatly reduces serious risk to your health. Obesity, smoking, and sedentary lifestyle greatly increases your risk for illness    A healthy diet, regular physical exercise & weight monitoring are important for maintaining a healthy lifestyle    You may be retaining fluid if you have a history of heart failure or if you experience any of the following symptoms:  Weight gain of 3 pounds or more overnight or 5 pounds in a week, increased swelling in our hands or feet or shortness of breath while lying flat in bed. Please call your doctor as soon as you notice any of these symptoms; do not wait until your next office visit.     Recognize signs and symptoms of STROKE:    F-face looks uneven    A-arms unable to move or move unevenly    S-speech slurred or non-existent    T-time-call 171 as soon as signs and symptoms begin-DO NOT go       Back to bed or wait to see if you get better-TIME IS BRAIN. Warning Signs of HEART ATTACK     Call 911 if you have these symptoms:   Chest discomfort. Most heart attacks involve discomfort in the center of the chest that lasts more than a few minutes, or that goes away and comes back. It can feel like uncomfortable pressure, squeezing, fullness, or pain.  Discomfort in other areas of the upper body. Symptoms can include pain or discomfort in one or both arms, the back, neck, jaw, or stomach.  Shortness of breath with or without chest discomfort.  Other signs may include breaking out in a cold sweat, nausea, or lightheadedness. Don't wait more than five minutes to call 911 - MINUTES MATTER! Fast action can save your life. Calling 911 is almost always the fastest way to get lifesaving treatment. Emergency Medical Services staff can begin treatment when they arrive -- up to an hour sooner than if someone gets to the hospital by car. The discharge information has been reviewed with the patient. The patient verbalized understanding. Discharge medications reviewed with the patient and appropriate educational materials and side effects teaching were provided.   ___________________________________________________________________________________________________________________________________  .,armband

## 2018-10-11 NOTE — INTERVAL H&P NOTE
H&P Update:  Monet Carrion was seen and examined. Pt returned 2 wks post op from L forearm loop AVG pseudoaneurysm revision with old blood draining from 8mm long 3-4mm wide skin separation, graft not seen, AVG with strong thrill. No erythema, no fever - presents for wound exlporation, drainage, possible revision as needed. No other changes.        Signed By: Florentin Cevallos MD     October 11, 2018 2:10 PM

## 2018-10-12 NOTE — OP NOTES
[address] REPORT    Sandro Block  MR#: [id number]  : 1940  ACCOUNT #: [de-identified]   DATE OF SERVICE: 10/11/2018    PREOPERATIVE DIAGNOSES:  Left forearm loop arteriovenous graft revision, wound hematoma with partial separation of the wound. POSTOPERATIVE DIAGNOSES:  Left forearm loop arteriovenous graft revision, wound hematoma with partial separation of the wound. PROCEDURES PERFORMED:  Left forearm loop arteriovenous graft, lateral wound exploration with evacuation of hematoma, pulse lavage and primary closure. SURGEON:  Kelley Collier MD     SURGICAL :  None. SURGICAL ASSISTANT:  Huma Bradshaw    INCISION START:  8487. INCISION CLOSE: 7084. ANESTHESIA:  General via LMA. ESTIMATED BLOOD LOSS:  5 mL. FLUIDS:  250 mL saline. PREPROCEDURE ANTIBIOTICS:  Clindamycin 900 mL. Pulse lavage was 3 liters with bacitracin and intraoperative wound application of dry rifampin approximately 300 mg. SPECIMENS REMOVED:  None. FINDINGS:  Old hematoma without evidence of infection along the 2 inch interposition Acuseal graft placed 2 weeks previously. This was completely removed with pulse lavage and there were no areas of bleeding noted. The wound was closed with interrupted 2-0 nylon mattress sutures. At completion, there was a strong thrill with pulsatile character that was unchanged from the preoperative exam.    COMPLICATIONS:  None. IMPLANTS:  None. DESCRIPTION OF PROCEDURE:  After consent was obtained, the patient was taken to the operating room. After satisfactory induction of general anesthesia with LMA, left arm was prepped from the fingers to the shoulder with ChloraPrep and the area of the fistula wound was prepped with Hibiclens. The area was then draped. The procedure then was performed. Incision was opened approximately 2 inches with dark old blood.   There appeared to be tissue between the graft and the skin opening; however, this was open. We then removed hematoma with [facility] as it was encountered and then pulse lavaged nearly 3 liters of saline with bacitracin. On exam, there was no evidence of remaining hematoma. There was no evidence of bleeding. FloSeal was placed for several minutes, partially removed, then dry rifampin was placed over the entire wound and the wound cavity and attempted to place a cutaneous Vicryl. However, this would not hold and subsequently 2-0 nylon mattress sutures were placed approximating the skin 1 cm apart. Xeroform gauze was placed with a folded gauze and Tegaderm dressing. There continued to be a thrill that was pulsatile, but unchanged from the preoperative condition. Patient had adequate length immediately for hemodialysis access. The patient was extubated in the operating room, taken to the recovery room in stable condition.       MD DINA Leonard / LN  D: 10/11/2018 17:26     T: 10/12/2018 08:45  JOB #: [id number]  CC: John Nguyen MD  CC: ZACHERY LOUIS DO

## 2020-05-27 ENCOUNTER — HOSPITAL ENCOUNTER (INPATIENT)
Age: 80
LOS: 2 days | Discharge: HOME HEALTH | DRG: 884 | End: 2020-05-29
Attending: INTERNAL MEDICINE | Admitting: INTERNAL MEDICINE
Payer: MEDICARE

## 2020-05-27 ENCOUNTER — HOSPITAL ENCOUNTER (INPATIENT)
Dept: CT IMAGING | Age: 80
Discharge: HOME | DRG: 884 | End: 2020-05-27
Attending: INTERNAL MEDICINE
Payer: MEDICARE

## 2020-05-27 ENCOUNTER — APPOINTMENT (OUTPATIENT)
Dept: GENERAL RADIOLOGY | Age: 80
DRG: 884 | End: 2020-05-27
Attending: INTERNAL MEDICINE
Payer: MEDICARE

## 2020-05-27 PROBLEM — G93.41 METABOLIC ENCEPHALOPATHY: Status: ACTIVE | Noted: 2020-05-27

## 2020-05-27 LAB
ALBUMIN SERPL-MCNC: 3.6 G/DL (ref 3.4–5)
ALBUMIN/GLOB SERPL: 1.1 {RATIO} (ref 0.8–1.7)
ALP SERPL-CCNC: 64 U/L (ref 45–117)
ALT SERPL-CCNC: 17 U/L (ref 13–56)
AMMONIA PLAS-SCNC: 13 UMOL/L (ref 11–32)
ANION GAP SERPL CALC-SCNC: 4 MMOL/L (ref 3–18)
AST SERPL-CCNC: 31 U/L (ref 10–38)
BASOPHILS # BLD: 0 K/UL (ref 0–0.1)
BASOPHILS NFR BLD: 0 % (ref 0–2)
BILIRUB SERPL-MCNC: 1.2 MG/DL (ref 0.2–1)
BUN SERPL-MCNC: 15 MG/DL (ref 7–18)
BUN/CREAT SERPL: 3 (ref 12–20)
CALCIUM SERPL-MCNC: 9.2 MG/DL (ref 8.5–10.1)
CALCIUM SERPL-MCNC: 9.3 MG/DL (ref 8.5–10.1)
CHLORIDE SERPL-SCNC: 102 MMOL/L (ref 100–111)
CK MB CFR SERPL CALC: 2.4 % (ref 0–4)
CK MB CFR SERPL CALC: ABNORMAL % (ref 0–4)
CK MB SERPL-MCNC: 1.2 NG/ML (ref 5–25)
CK MB SERPL-MCNC: <1 NG/ML (ref 5–25)
CK SERPL-CCNC: 48 U/L (ref 26–192)
CK SERPL-CCNC: 50 U/L (ref 26–192)
CO2 SERPL-SCNC: 34 MMOL/L (ref 21–32)
CREAT SERPL-MCNC: 4.5 MG/DL (ref 0.6–1.3)
DIFFERENTIAL METHOD BLD: ABNORMAL
EOSINOPHIL # BLD: 0.1 K/UL (ref 0–0.4)
EOSINOPHIL NFR BLD: 2 % (ref 0–5)
ERYTHROCYTE [DISTWIDTH] IN BLOOD BY AUTOMATED COUNT: 17.4 % (ref 11.6–14.5)
FOLATE SERPL-MCNC: 4.5 NG/ML (ref 3.1–17.5)
GLOBULIN SER CALC-MCNC: 3.3 G/DL (ref 2–4)
GLUCOSE SERPL-MCNC: 131 MG/DL (ref 74–99)
HCT VFR BLD AUTO: 34 % (ref 35–45)
HGB BLD-MCNC: 10.8 G/DL (ref 12–16)
LYMPHOCYTES # BLD: 0.9 K/UL (ref 0.9–3.6)
LYMPHOCYTES NFR BLD: 18 % (ref 21–52)
MCH RBC QN AUTO: 31.7 PG (ref 24–34)
MCHC RBC AUTO-ENTMCNC: 31.8 G/DL (ref 31–37)
MCV RBC AUTO: 99.7 FL (ref 74–97)
MONOCYTES # BLD: 0.3 K/UL (ref 0.05–1.2)
MONOCYTES NFR BLD: 6 % (ref 3–10)
NEUTS SEG # BLD: 3.8 K/UL (ref 1.8–8)
NEUTS SEG NFR BLD: 74 % (ref 40–73)
PHOSPHATE SERPL-MCNC: 2.3 MG/DL (ref 2.5–4.9)
PLATELET # BLD AUTO: 98 K/UL (ref 135–420)
POTASSIUM SERPL-SCNC: 3.2 MMOL/L (ref 3.5–5.5)
PROT SERPL-MCNC: 6.9 G/DL (ref 6.4–8.2)
PTH-INTACT SERPL-MCNC: 167.1 PG/ML (ref 18.4–88)
RBC # BLD AUTO: 3.41 M/UL (ref 4.2–5.3)
SODIUM SERPL-SCNC: 140 MMOL/L (ref 136–145)
TROPONIN I SERPL-MCNC: 0.06 NG/ML (ref 0–0.04)
TROPONIN I SERPL-MCNC: 0.07 NG/ML (ref 0–0.04)
VIT B12 SERPL-MCNC: 1816 PG/ML (ref 211–911)
WBC # BLD AUTO: 5.2 K/UL (ref 4.6–13.2)

## 2020-05-27 PROCEDURE — 71045 X-RAY EXAM CHEST 1 VIEW: CPT

## 2020-05-27 PROCEDURE — 87086 URINE CULTURE/COLONY COUNT: CPT

## 2020-05-27 PROCEDURE — 70450 CT HEAD/BRAIN W/O DYE: CPT

## 2020-05-27 PROCEDURE — 36415 COLL VENOUS BLD VENIPUNCTURE: CPT

## 2020-05-27 PROCEDURE — 87040 BLOOD CULTURE FOR BACTERIA: CPT

## 2020-05-27 PROCEDURE — 74011250637 HC RX REV CODE- 250/637: Performed by: INTERNAL MEDICINE

## 2020-05-27 PROCEDURE — 72131 CT LUMBAR SPINE W/O DYE: CPT

## 2020-05-27 PROCEDURE — 74011250636 HC RX REV CODE- 250/636: Performed by: INTERNAL MEDICINE

## 2020-05-27 PROCEDURE — 82550 ASSAY OF CK (CPK): CPT

## 2020-05-27 PROCEDURE — 82607 VITAMIN B-12: CPT

## 2020-05-27 PROCEDURE — 85025 COMPLETE CBC W/AUTO DIFF WBC: CPT

## 2020-05-27 PROCEDURE — 77030019905 HC CATH URETH INTMIT MDII -A

## 2020-05-27 PROCEDURE — 80053 COMPREHEN METABOLIC PANEL: CPT

## 2020-05-27 PROCEDURE — 83970 ASSAY OF PARATHORMONE: CPT

## 2020-05-27 PROCEDURE — 72125 CT NECK SPINE W/O DYE: CPT

## 2020-05-27 PROCEDURE — 84100 ASSAY OF PHOSPHORUS: CPT

## 2020-05-27 PROCEDURE — 82140 ASSAY OF AMMONIA: CPT

## 2020-05-27 PROCEDURE — 65270000029 HC RM PRIVATE

## 2020-05-27 RX ORDER — HEPARIN SODIUM 5000 [USP'U]/ML
5000 INJECTION, SOLUTION INTRAVENOUS; SUBCUTANEOUS EVERY 8 HOURS
Status: DISCONTINUED | OUTPATIENT
Start: 2020-05-27 | End: 2020-05-29 | Stop reason: HOSPADM

## 2020-05-27 RX ORDER — POTASSIUM CHLORIDE 20 MEQ/1
40 TABLET, EXTENDED RELEASE ORAL EVERY 4 HOURS
Status: COMPLETED | OUTPATIENT
Start: 2020-05-27 | End: 2020-05-27

## 2020-05-27 RX ORDER — ONDANSETRON 2 MG/ML
4 INJECTION INTRAMUSCULAR; INTRAVENOUS
Status: DISCONTINUED | OUTPATIENT
Start: 2020-05-27 | End: 2020-05-29 | Stop reason: HOSPADM

## 2020-05-27 RX ORDER — DIPHENHYDRAMINE HYDROCHLORIDE 50 MG/ML
12.5 INJECTION, SOLUTION INTRAMUSCULAR; INTRAVENOUS
Status: DISCONTINUED | OUTPATIENT
Start: 2020-05-27 | End: 2020-05-29 | Stop reason: HOSPADM

## 2020-05-27 RX ORDER — LEVOFLOXACIN 5 MG/ML
250 INJECTION, SOLUTION INTRAVENOUS EVERY 24 HOURS
Status: DISCONTINUED | OUTPATIENT
Start: 2020-05-27 | End: 2020-05-27

## 2020-05-27 RX ORDER — LEVOFLOXACIN 5 MG/ML
500 INJECTION, SOLUTION INTRAVENOUS
Status: DISCONTINUED | OUTPATIENT
Start: 2020-05-27 | End: 2020-05-29 | Stop reason: HOSPADM

## 2020-05-27 RX ADMIN — LEVOFLOXACIN 500 MG: 5 INJECTION, SOLUTION INTRAVENOUS at 18:26

## 2020-05-27 RX ADMIN — POTASSIUM CHLORIDE 40 MEQ: 1500 TABLET, EXTENDED RELEASE ORAL at 17:00

## 2020-05-27 NOTE — PROGRESS NOTES
Problem: Falls - Risk of  Goal: *Absence of Falls  Description: Document Glenroy Robbins Fall Risk and appropriate interventions in the flowsheet. Outcome: Progressing Towards Goal  Note: Fall Risk Interventions:       Mentation Interventions: Adequate sleep, hydration, pain control, Bed/chair exit alarm, Door open when patient unattended    Medication Interventions: Patient to call before getting OOB, Teach patient to arise slowly, Bed/chair exit alarm    Elimination Interventions: Call light in reach, Bed/chair exit alarm              Problem: General Infection Care Plan (Adult and Pediatric)  Goal: Improvement in signs and symptoms of infection  Outcome: Progressing Towards Goal  Goal: *Optimize nutritional status  Outcome: Progressing Towards Goal     Problem: Pressure Injury - Risk of  Goal: *Prevention of pressure injury  Description: Document Trell Scale and appropriate interventions in the flowsheet. Outcome: Progressing Towards Goal  Note: Pressure Injury Interventions:             Activity Interventions: Increase time out of bed, PT/OT evaluation, Pressure redistribution bed/mattress(bed type)    Mobility Interventions: PT/OT evaluation, Pressure redistribution bed/mattress (bed type), HOB 30 degrees or less    Nutrition Interventions: Document food/fluid/supplement intake                     Problem: Patient Education: Go to Patient Education Activity  Goal: Patient/Family Education  Outcome: Progressing Towards Goal

## 2020-05-27 NOTE — PROGRESS NOTES
Pharmacy Dosing Services: [address]    Pharmacist Renal Dosing Progress Note for    Physician Dr. Jose G Vizcaino    The following medication: Levaquin was automatically dose-adjusted per THE [facility] P&T Committee Protocol, with respect to renal function. Consult provided for this   79 y.o. , female , for the indication of Intra-Abdominal.    Pt Weight:   Wt Readings from Last 1 Encounters:   05/27/20 61.5 kg (135 lb 9.6 oz)         Serum Creatinine Lab Results   Component Value Date/Time    Creatinine 4.50 (H) 05/27/2020 02:31 PM       Creatinine Clearance Estimated Creatinine Clearance: 8.2 mL/min (A) (based on SCr of 4.5 mg/dL (H)). BUN Lab Results   Component Value Date/Time    BUN 15 05/27/2020 02:31 PM    BUN, POC 29 (H) 10/11/2018 12:31 PM       Dosage changed to:  Levaquin 500 mg IV every 48 hours @1800 on 5/27/2020    Pharmacy to continue to monitor patient daily. Will make dosage adjustments based upon changing renal function. Signed Ca.  Contact information:[phone]

## 2020-05-27 NOTE — H&P
History & Physical    Patient: Carie Smith MRN: [id number]  CSN: [id number]    YOB: 1940  Age: 79 y.o. Sex: female      DOA: 5/27/2020    Chief Complaint: No chief complaint on file. HPI:     Carie Smith is a 79 y.o.  female who has ESRD on dialysis MWF, DM CAD and AVR  ? Seizure   Presents to Dr. Nila Contreras office with episodes of confusion /undressing and forgetfulness per  ; Has had frequent falls with loss of balance ; patient unclear if she hit her head   Recent [place] [facility]ization for dyspnea   Echo  Normal left ventricular size with mildly increased volumetric index,   mildly reduced systolic function with global hypokinesis, estimated LVEF   is 50%. · Moderate posterior mitral annular calcification. There is mild mitral   regurgitation. There is mild mitral stenosis. · Moderate tricuspid regurgitation with moderately elevated estimated   pulmonary pressures. Mean pulmonary artery pressure is indirectly   calculated from pulmonary regurgitation velocity and estimated to be   around 40-42 mm Hg. · There is a bioprosthetic aortic valve present. There is moderate josafat   and possibly some intra valvular regurgitation. Mean gradient across the   valve is stable at 11 mm Hg. · Left atrium is moderately dilated. · Moderate diastolic dysfunction with elevated filling pressures. · Compared to echocardiogram from February of 2020: Mitral regurgitation   is mild, TAVR valve appears stable with mean gradient of 11, moderate   aortic regurgitation. Pulmonary artery systolic pressure is likely   elevated in at least moderate range. · Left ventricular systolic function is now mildly decreased.       Past Medical History:   Diagnosis Date    Arthritis     CAD (coronary artery disease) 2014    triple bypass    Chronic kidney disease     DIALYSIS M-W-F    Diabetes ([address]     Hypertension     Renal dialysis status(V45.11)     S/P CABG x 3        Past Surgical History:   Procedure Laterality Date    ABDOMEN SURGERY PROC UNLISTED  1976    gall stones    CARDIAC SURG PROCEDURE UNLIST  AUG 2014    BYPASS-3 WAY    HX OTHER SURGICAL  1/2015    2 cardiac stents    HX VASCULAR ACCESS      left arm vasc. access    ROB CATHETER  2/1/2014            No family history on file. Social History     Socioeconomic History    Marital status:      Spouse name: Not on file    Number of children: Not on file    Years of education: Not on file    Highest education level: Not on file   Tobacco Use    Smoking status: Never Smoker    Smokeless tobacco: Never Used   Substance and Sexual Activity    Alcohol use: No    Drug use: No   Other Topics Concern       Prior to Admission medications    Medication Sig Start Date End Date Taking? Authorizing Provider   HYDROcodone-acetaminophen (NORCO) 5-325 mg per tablet Take 1 Tab by mouth every four (4) hours as needed for Pain. Max Daily Amount: 6 Tabs. 10/11/18   Giancarlo Worrell MD   calcium acetate (PHOSLO) 667 mg cap Take 2 Caps by mouth two (2) times a day. Provider, Historical   atorvastatin (LIPITOR) 10 mg tablet Take 10 mg by mouth daily. Provider, Historical   sevelamer (RENAGEL) 800 mg tablet Take  by mouth three (3) times daily (with meals). Provider, Historical   losartan (COZAAR) 100 mg tablet Take 1 Tab by mouth daily. 2/11/14   Hemalatha Reyes MD   aspirin 81 mg chewable tablet Take 81 mg by mouth daily. Provider, Historical       Allergies   Allergen Reactions    Contrast Agent [Iodine] Rash    Penicillins Hives         Review of Systems  GENERAL: Patient alert, awake and oriented times to name and place not month or year  able to communicate full sentences and not in distress. HEENT: No change in vision, no earache, tinnitus, sore throat or sinus congestion. NECK: No pain or stiffness. PULMONARY: No shortness of breath, cough or wheeze.    Cardiovascular: no pnd or orthopnea, no CP  GASTROINTESTINAL: No abdominal pain, nausea, vomiting or diarrhea, melena or bright red blood per rectum. GENITOURINARY: anuric    MUSCULOSKELETAL: No joint or muscle pain, no back pain, +recent trauma. DERMATOLOGIC: No rash, no itching, no lesions. ENDOCRINE: No polyuria, polydipsia, no heat or cold intolerance. No recent change in weight. HEMATOLOGICAL+ anemia or easy bruising or bleeding. NEUROLOGIC: No headache, +seizures,in the past  numbness, tingling + weakness. Physical Exam:     Physical Exam:  Visit Vitals  /61 (BP 1 Location: Right arm, BP Patient Position: At rest)   Pulse 76   Temp 97.7 °F (36.5 °C)   Resp 15   SpO2 100%           Temp (24hrs), Av.8 °F (36.6 °C), Min:97.7 °F (36.5 °C), Max:97.9 °F (36.6 °C)    No intake/output data recorded. No intake/output data recorded. General:  Alert, cooperative, no distress, appears stated age. Head: Normocephalic, without obvious abnormality, atraumatic. Eyes:  Conjunctivae/corneas clear. PERRL, EOMs intact. Nose: Nares normal. No drainage or sinus tenderness. Neck: Supple, symmetrical, trachea midline, no adenopathy, thyroid: no enlargement, no carotid bruit and no JVD. Lungs:   Clear to auscultation bilaterally. Heart:  Regular rate and rhythm, S1, S2 normal.     Abdomen: Soft, non-tender. Bowel sounds normal.    Extremities: Extremities normal, atraumatic, no cyanosis or edema. Pulses: 2+ and symmetric all extremities. Skin:  No rashes or lesions   Neurologic: AAOx3, No focal motor or sensory deficit.    Patient with diffiulty with rapid alternating hand movements and finger to nose     Labs Reviewed:  Recent Results (from the past 24 hour(s))   CBC WITH AUTOMATED DIFF    Collection Time: 20  2:31 PM   Result Value Ref Range    WBC 5.2 4.6 - 13.2 K/uL    RBC 3.41 (L) 4.20 - 5.30 M/uL    HGB 10.8 (L) 12.0 - 16.0 g/dL    HCT 34.0 (L) 35.0 - 45.0 %    MCV 99.7 (H) 74.0 - 97.0 FL    MCH 31.7 24.0 - 34.0 PG    MCHC 31.8 31.0 - 37.0 g/dL    RDW 17.4 (H) 11.6 - 14.5 %    PLATELET 98 (L) 490 - 420 K/uL    NEUTROPHILS 74 (H) 40 - 73 %    LYMPHOCYTES 18 (L) 21 - 52 %    MONOCYTES 6 3 - 10 %    EOSINOPHILS 2 0 - 5 %    BASOPHILS 0 0 - 2 %    ABS. NEUTROPHILS 3.8 1.8 - 8.0 K/UL    ABS. LYMPHOCYTES 0.9 0.9 - 3.6 K/UL    ABS. MONOCYTES 0.3 0.05 - 1.2 K/UL    ABS. EOSINOPHILS 0.1 0.0 - 0.4 K/UL    ABS. BASOPHILS 0.0 0.0 - 0.1 K/UL    DF AUTOMATED     METABOLIC PANEL, COMPREHENSIVE    Collection Time: 05/27/20  2:31 PM   Result Value Ref Range    Sodium 140 136 - 145 mmol/L    Potassium 3.2 (L) 3.5 - 5.5 mmol/L    Chloride 102 100 - 111 mmol/L    CO2 34 (H) 21 - 32 mmol/L    Anion gap 4 3.0 - 18 mmol/L    Glucose 131 (H) 74 - 99 mg/dL    BUN 15 7.0 - 18 MG/DL    Creatinine 4.50 (H) 0.6 - 1.3 MG/DL    BUN/Creatinine ratio 3 (L) 12 - 20      GFR est AA 11 (L) >60 ml/min/1.73m2    GFR est non-AA 9 (L) >60 ml/min/1.73m2    Calcium 9.2 8.5 - 10.1 MG/DL    Bilirubin, total 1.2 (H) 0.2 - 1.0 MG/DL    ALT (SGPT) 17 13 - 56 U/L    AST (SGOT) 31 10 - 38 U/L    Alk. phosphatase 64 45 - 117 U/L    Protein, total 6.9 6.4 - 8.2 g/dL    Albumin 3.6 3.4 - 5.0 g/dL    Globulin 3.3 2.0 - 4.0 g/dL    A-G Ratio 1.1 0.8 - 1.7     PHOSPHORUS    Collection Time: 05/27/20  2:31 PM   Result Value Ref Range    Phosphorus 2.3 (L) 2.5 - 4.9 MG/DL   CARDIAC PANEL,(CK, CKMB & TROPONIN)    Collection Time: 05/27/20  2:31 PM   Result Value Ref Range    CK - MB 1.2 <3.6 ng/ml    CK-MB Index 2.4 0.0 - 4.0 %    CK 50 26 - 192 U/L    Troponin-I, QT 0.06 (H) 0.0 - 0.045 NG/ML     All lab results for the last 24 hours reviewed.    and EKG    Procedures/imaging: see electronic medical records for all procedures/Xrays and details which were not copied into this note but were reviewed prior to creation of Plan  Recent Results (from the past 24 hour(s))   CBC WITH AUTOMATED DIFF    Collection Time: 05/27/20  2:31 PM   Result Value Ref Range    WBC 5.2 4.6 - 13.2 K/uL    RBC 3.41 (L) 4.20 - 5.30 M/uL    HGB 10.8 (L) 12.0 - 16.0 g/dL    HCT 34.0 (L) 35.0 - 45.0 %    MCV 99.7 (H) 74.0 - 97.0 FL    MCH 31.7 24.0 - 34.0 PG    MCHC 31.8 31.0 - 37.0 g/dL    RDW 17.4 (H) 11.6 - 14.5 %    PLATELET 98 (L) 535 - 420 K/uL    NEUTROPHILS 74 (H) 40 - 73 %    LYMPHOCYTES 18 (L) 21 - 52 %    MONOCYTES 6 3 - 10 %    EOSINOPHILS 2 0 - 5 %    BASOPHILS 0 0 - 2 %    ABS. NEUTROPHILS 3.8 1.8 - 8.0 K/UL    ABS. LYMPHOCYTES 0.9 0.9 - 3.6 K/UL    ABS. MONOCYTES 0.3 0.05 - 1.2 K/UL    ABS. EOSINOPHILS 0.1 0.0 - 0.4 K/UL    ABS. BASOPHILS 0.0 0.0 - 0.1 K/UL    DF AUTOMATED     METABOLIC PANEL, COMPREHENSIVE    Collection Time: 05/27/20  2:31 PM   Result Value Ref Range    Sodium 140 136 - 145 mmol/L    Potassium 3.2 (L) 3.5 - 5.5 mmol/L    Chloride 102 100 - 111 mmol/L    CO2 34 (H) 21 - 32 mmol/L    Anion gap 4 3.0 - 18 mmol/L    Glucose 131 (H) 74 - 99 mg/dL    BUN 15 7.0 - 18 MG/DL    Creatinine 4.50 (H) 0.6 - 1.3 MG/DL    BUN/Creatinine ratio 3 (L) 12 - 20      GFR est AA 11 (L) >60 ml/min/1.73m2    GFR est non-AA 9 (L) >60 ml/min/1.73m2    Calcium 9.2 8.5 - 10.1 MG/DL    Bilirubin, total 1.2 (H) 0.2 - 1.0 MG/DL    ALT (SGPT) PENDING U/L    AST (SGOT) 31 10 - 38 U/L    Alk. phosphatase 64 45 - 117 U/L    Protein, total 6.9 6.4 - 8.2 g/dL    Albumin 3.6 3.4 - 5.0 g/dL    Globulin 3.3 2.0 - 4.0 g/dL    A-G Ratio 1.1 0.8 - 1.7     PHOSPHORUS    Collection Time: 05/27/20  2:31 PM   Result Value Ref Range    Phosphorus 2.3 (L) 2.5 - 4.9 MG/DL   CARDIAC PANEL,(CK, CKMB & TROPONIN)    Collection Time: 05/27/20  2:31 PM   Result Value Ref Range    CK - MB 1.2 <3.6 ng/ml    CK-MB Index 2.4 0.0 - 4.0 %    CK 50 26 - 192 U/L    Troponin-I, QT 0.06 (H) 0.0 - 0.045 NG/ML       Assessment/Plan     Active Problems:   1. Metabolic encephalopathy (4/23/4587)   2. Fall  3 . ? UTI  4. DM  5. CAD/CABG recent  AVR    Plan:   Will admit to remote Tele  Check UA  Start emperic antibiotic d/c once culture negative   and CT head spine lumbar/neck   Monitor electrolytes /renal consult for dialysis   will check MRI for stroke   Sliding scale insulin   Attempted to call  no answer          DVT/GI Prophylaxis: Hep SQ    Discussed with patient at bedside about hospital admission and my plan care, who understood and agree with my plan care.     Solitario Wolfe MD  5/27/2020 4:01 PM

## 2020-05-28 ENCOUNTER — APPOINTMENT (OUTPATIENT)
Dept: MRI IMAGING | Age: 80
DRG: 884 | End: 2020-05-28
Attending: INTERNAL MEDICINE
Payer: MEDICARE

## 2020-05-28 LAB
BACTERIA SPEC CULT: ABNORMAL
CC UR VC: ABNORMAL
SERVICE CMNT-IMP: ABNORMAL

## 2020-05-28 PROCEDURE — 65270000029 HC RM PRIVATE

## 2020-05-28 PROCEDURE — 97116 GAIT TRAINING THERAPY: CPT

## 2020-05-28 PROCEDURE — 74011250637 HC RX REV CODE- 250/637: Performed by: INTERNAL MEDICINE

## 2020-05-28 PROCEDURE — 97162 PT EVAL MOD COMPLEX 30 MIN: CPT

## 2020-05-28 PROCEDURE — 70551 MRI BRAIN STEM W/O DYE: CPT

## 2020-05-28 RX ORDER — GUAIFENESIN 100 MG/5ML
81 LIQUID (ML) ORAL DAILY
Status: DISCONTINUED | OUTPATIENT
Start: 2020-05-29 | End: 2020-05-29 | Stop reason: HOSPADM

## 2020-05-28 RX ORDER — LOSARTAN POTASSIUM 50 MG/1
100 TABLET ORAL DAILY
Status: DISCONTINUED | OUTPATIENT
Start: 2020-05-29 | End: 2020-05-29 | Stop reason: HOSPADM

## 2020-05-28 RX ORDER — CALCIUM ACETATE 667 MG/1
2 CAPSULE ORAL 2 TIMES DAILY
Status: DISCONTINUED | OUTPATIENT
Start: 2020-05-28 | End: 2020-05-29

## 2020-05-28 RX ORDER — ATORVASTATIN CALCIUM 10 MG/1
10 TABLET, FILM COATED ORAL DAILY
Status: DISCONTINUED | OUTPATIENT
Start: 2020-05-29 | End: 2020-05-29 | Stop reason: HOSPADM

## 2020-05-28 RX ORDER — SEVELAMER CARBONATE 800 MG/1
800 TABLET, FILM COATED ORAL
Status: DISCONTINUED | OUTPATIENT
Start: 2020-05-28 | End: 2020-05-29 | Stop reason: HOSPADM

## 2020-05-28 RX ORDER — ACETAMINOPHEN 325 MG/1
650 TABLET ORAL
Status: DISCONTINUED | OUTPATIENT
Start: 2020-05-28 | End: 2020-05-29 | Stop reason: HOSPADM

## 2020-05-28 RX ADMIN — CALCIUM ACETATE 1334 MG: 667 CAPSULE ORAL at 21:01

## 2020-05-28 NOTE — PROGRESS NOTES
Problem: Falls - Risk of  Goal: *Absence of Falls  Description: [facility] and appropriate interventions in the flowsheet.   5/28/2020 1413 by Angel Ayala RN  Outcome: Progressing Towards Goal  Note: Fall Risk Interventions:  Mobility Interventions: Patient to call before getting OOB, PT Consult for mobility concerns, PT Consult for assist device competence, Strengthening exercises (ROM-active/passive), Utilize walker, cane, or other assistive device    Mentation Interventions: Adequate sleep, hydration, pain control    Medication Interventions: Patient to call before getting OOB, Teach patient to arise slowly    Elimination Interventions: Call light in reach, Elevated toilet seat, Patient to call for help with toileting needs, Stay With Me (per policy), Toilet paper/wipes in reach, Toileting schedule/hourly rounds           5/28/2020 1413 by Angel Ayala RN  Outcome: Progressing Towards Goal  Note: Fall Risk Interventions:  Mobility Interventions: Patient to call before getting OOB, PT Consult for mobility concerns, PT Consult for assist device competence, Strengthening exercises (ROM-active/passive), Utilize walker, cane, or other assistive device    Mentation Interventions: Adequate sleep, hydration, pain control    Medication Interventions: Patient to call before getting OOB, Teach patient to arise slowly    Elimination Interventions: Call light in reach, Elevated toilet seat, Patient to call for help with toileting needs, Stay With Me (per policy), Toilet paper/wipes in reach, Toileting schedule/hourly rounds

## 2020-05-28 NOTE — PROGRESS NOTES
4015-patient found ambulating in room thinking she was leaving, she had put tele bocxin purse. Reapllied tele box and leads. 6167 - Patient in bed sitting at side at this time. A/O x 2. IV to right FA  intact and patent. Patient has AV fistula in lower left arm that is positive for bruit and thrill. Guaze and tape dressing to lower left ar, CDI. Patient dneies numbness/tingling. Pedal pulses palpable. Lungs clear. Bowel sounds active to all quadrants. Pain 0/10.     1131-Replaced tele box leads, confirmed with tele monitor tech that patient was back on monitor. 1549-Called Dr. Villa Rowland MD, the patient's cardiologist at [address] [place] [address], to request information on patient's artificial valve. Per [facility], patient's heart valve is an Edward Oni 23 valve 23 mm. This nurse requested information to be faxed as well. 1555-This nurse relayed heart valve info to [facility] in [address] [place] [address]      5875-Dayna Reese [phone] 1846-patient returned from MRI at thsi time, resting quietly with callbell within reach. V/S assessed, see flow sheet. 2035-Gave update to patient's family member, daughter Judie Awad.

## 2020-05-28 NOTE — PROGRESS NOTES
Reason for Admission:   Metabolic encephalopathy                    RUR Score:   9%                  Plan for utilizing home health:     TBD     PCP: First and Last name:     Name of Practice:    Are you a current patient: Yes/No:    Approximate date of last visit:                     Current Advanced Directive/Advance Care Plan:  Not on file                         Transition of Care Plan:  [facility] and physician follow up vs SNF                    Chart reviewed. Per H&P Teja Blunt is a 79 y.o.  female who has ESRD on dialysis MWF, DM CAD and AVR  ? Seizure   Presents to Dr. Atiya Grant office with episodes of confusion /undressing and forgetfulness per  ; Has had frequent falls with loss of balance ; patient unclear if she hit her head   Recent [place] [facility]ization for dyspnea   Echo  Normal left ventricular size with mildly increased volumetric index,   mildly reduced systolic function with global hypokinesis, estimated LVEF   is 50%. · Moderate posterior mitral annular calcification. There is mild mitral   regurgitation. There is mild mitral stenosis. · Moderate tricuspid regurgitation with moderately elevated estimated   pulmonary pressures. Mean pulmonary artery pressure is indirectly   calculated from pulmonary regurgitation velocity and estimated to be   around 40-42 mm Hg. · There is a bioprosthetic aortic valve present. There is moderate josafat   and possibly some intra valvular regurgitation. Mean gradient across the   valve is stable at 11 mm Hg. · Left atrium is moderately dilated. · Moderate diastolic dysfunction with elevated filling pressures. · Compared to echocardiogram from February of 2020: Mitral regurgitation   is mild, TAVR valve appears stable with mean gradient of 11, moderate   aortic regurgitation. Pulmonary artery systolic pressure is likely   elevated in at least moderate range. · Left ventricular systolic function is now mildly decreased. \"    CM attempted to contact pt's , Chelsea Horne ([phone]; [phone]), and left a message requesting a return call. Pt with therapy consult pending. CM to await outcome of therapy evaluation to further assist with transition of care. Anticipate pt will transition home with [facility] services vs SNF.   CM to continue to follow and assist.    Care Management Interventions  Transition of Care Consult (CM Consult): Discharge Planning  Health Maintenance Reviewed: Yes  Physical Therapy Consult: Yes  Current Support Network: Lives with Spouse  Confirm Follow Up Transport: Family

## 2020-05-28 NOTE — PROGRESS NOTES
Seems more alert at this time. Did not remember that I had talked to her regarding how to call for nurse and using the phone.

## 2020-05-28 NOTE — PROGRESS NOTES
Held Heparin for platelets of 98. Dr. Leandro Cartwright notified. Patient had an uneventful shift. Patient's family has been updated. Bedside and Verbal shift change report given to Ms. Lakesha Dinh RN (oncoming nurse) by Kimberly Morelos RN (offgoing nurse). Report included the following information SBAR, Kardex, Procedure Summary, Intake/Output, MAR and Recent Results.

## 2020-05-28 NOTE — CONSULTS
NEUROLOGY CONSULTATION NOTE    Patient: Patrick Fierro MRN: [id number]  CSN: [id number]    YOB: 1940  Age: 79 y.o. Sex: female    DOA: 5/27/2020 LOS:  LOS: 1 day        Requesting Physician: Dr. Vanessa Fuller  Reason for Consultation: Altered mental status            HISTORY OF PRESENT ILLNESS:   Patrick Fierro is an 76-year-old female with history of hypertension, dyslipidemia, chronic kidney disease (on HD), diabetes and coronary artery disease, who presented with confusion. The patient is not able to give history but it seems from outside record records that she has been having memory problems for some time. She presented to hospital with frequent falls, loss of balance and confusional states. Her recent workup showed normal B12 and folate levels. The patient tells me that she may produce urine sometimes but she doesnt have any incontinence. She has gait difficulties due to peripheral neuropathy and ankle pain. She doesnt describe magnetic gait. She doesnt unders remarkable memory issues. Her MRI showed ventriculomegaly with some atrophy. There was a concern for normal pressure hydrocephalus, therefore, Neurology consult was requested. PAST MEDICAL HISTORY:  Past Medical History:   Diagnosis Date    Arthritis     CAD (coronary artery disease) 2014    triple bypass    Chronic kidney disease     DIALYSIS M-W-F    Diabetes ([facility] [address]     Hypertension     Renal dialysis status(V45.11)     S/P CABG x 3      PAST SURGICAL HISTORY:  Past Surgical History:   Procedure Laterality Date    ABDOMEN SURGERY PROC UNLISTED  1976    gall stones    CARDIAC SURG PROCEDURE UNLIST  AUG 2014    BYPASS-3 WAY    HX OTHER SURGICAL  1/2015    2 cardiac stents    HX VASCULAR ACCESS      left arm vasc. access    ROB CATHETER  2/1/2014          FAMILY HISTORY:  No family history on file.   SOCIAL HISTORY:  Social History     Socioeconomic History    Marital status:      Spouse name: Not on file    Number of children: Not on file    Years of education: Not on file    Highest education level: Not on file   Tobacco Use    Smoking status: Never Smoker    Smokeless tobacco: Never Used   Substance and Sexual Activity    Alcohol use: No    Drug use: No   Other Topics Concern     MEDICATIONS:  Current Facility-Administered Medications   Medication Dose Route Frequency    acetaminophen (TYLENOL) tablet 650 mg  650 mg Oral Q6H PRN    [START ON 5/29/2020] epoetin ravi-epbx (RETACRIT) injection 3,000 Units  3,000 Units SubCUTAneous Q MON, WED & FRI    ondansetron (ZOFRAN) injection 4 mg  4 mg IntraVENous Q4H PRN    diphenhydrAMINE (BENADRYL) injection 12.5 mg  12.5 mg IntraVENous Q4H PRN    heparin (porcine) injection 5,000 Units  5,000 Units SubCUTAneous Q8H    levoFLOXacin (LEVAQUIN) 500 mg in D5W IVPB  500 mg IntraVENous Q48H     Prior to Admission medications    Medication Sig Start Date End Date Taking? Authorizing Provider   HYDROcodone-acetaminophen (NORCO) 5-325 mg per tablet Take 1 Tab by mouth every four (4) hours as needed for Pain. Max Daily Amount: 6 Tabs. 10/11/18   Nancy Worrell MD   calcium acetate (PHOSLO) 667 mg cap Take 2 Caps by mouth two (2) times a day. Provider, Historical   atorvastatin (LIPITOR) 10 mg tablet Take 10 mg by mouth daily. Provider, Historical   sevelamer (RENAGEL) 800 mg tablet Take  by mouth three (3) times daily (with meals). Provider, Historical   losartan (COZAAR) 100 mg tablet Take 1 Tab by mouth daily. 2/11/14   Rama Yun MD   aspirin 81 mg chewable tablet Take 81 mg by mouth daily. Provider, Historical       ALLERGIES:  Allergies   Allergen Reactions    Contrast Agent [Iodine] Rash    Penicillins Hives       Review of Systems  GENERAL: No fevers or chills. HEENT: No change in vision, earache, tinnitus, sore throat or sinus congestion. NECK: No pain or stiffness. CARDIOVASCULAR: No chest pain or pressure. No palpitations. PULMONARY: No shortness of breath, cough or wheeze. GASTROINTESTINAL: No abdominal pain, nausea, vomiting or diarrhea. GENITOURINARY: No urinary frequency or dysuria. MUSCULOSKELETAL: She endorses joint pains in her ankle and knees. DERMATOLOGIC: No rash, no itching, no lesions. ENDOCRINE: No heat or cold intolerance. HEMATOLOGICAL: No anemia or easy bruising or bleeding. NEUROLOGIC: No headache, seizures, numbness, tingling or weakness. PHYSICAL EXAMINATION:     Visit Vitals  /84   Pulse 88   Temp 97.6 °F (36.4 °C)   Resp 17   Ht 5' 1\" (1.549 m)   Wt 62 kg (136 lb 11.2 oz)   SpO2 100%   BMI 25.83 kg/m²      O2 Device: Room air  GENERAL: Pleasant, in no apparent distress. HEENT: Moist mucous membranes, sclerae anicteric, scalp is atraumatic. CVS: Regular rate and rhythm, no murmurs or gallops. No carotid bruits. PULMONARY: Clear to auscultation bilaterally. No rales or rhonchi. No wheezing. EXTREMITIES: Normal range of motion at all sites. No deformities. ABDOMEN: Soft, nontender. SKIN: No rashes or ecchymoses. Warm and dry. NEUROLOGIC: Alert and oriented to self only. She thinks she's at home. She is not oriented to month but she says it is 2020. Speech is fluent without any aphasia or dysarthria. Cranial nerves: Face is symmetric with symmetric smile. Facial sensation is intact. Extraocular movements are intact with no nystagmus. Visual fields are full to confrontation. PERRL. Tongue is midline. Palate elevates symmetrically. Hearing intact to speech. Sternocleidomastoid and trapezius strengths are full bilaterally. Motor: Normal tone and normal bulk on all four extremities. Strength is full on all four segmentally. There is no pronator drift or orbiting. Sensory: Decreased sensation to vibration the length dependent fashion. Coordination: Intact coordination with finger-nose-finger bilaterally.   Deep tendon reflexes: 2+ at biceps, brachioradialis, Decreased reflexes on lower activities. Gait assessment: Able to stand and walk Slowly. She doesn't have any magnetic gait or Parkinsonism features. Labs: Results:       Chemistry Recent Labs     05/27/20  1431   *      K 3.2*      CO2 34*   BUN 15   CREA 4.50*   CA 9.3  9.2   AGAP 4   BUCR 3*   AP 64   TP 6.9   ALB 3.6   GLOB 3.3   AGRAT 1.1      CBC w/Diff Recent Labs     05/27/20  1431   WBC 5.2   RBC 3.41*   HGB 10.8*   HCT 34.0*   PLT 98*   GRANS 74*   LYMPH 18*   EOS 2      Cardiac Enzymes Recent Labs     05/27/20  2130 05/27/20  1431   CPK 48 50   CKND1 CALCULATION NOT PERFORMED WHEN RESULT IS BELOW LINEAR LIMIT 2.4      Coagulation No results for input(s): PTP, INR, APTT, INREXT in the last 72 hours. Lipid Panel No results found for: CHOL, CHOLPOCT, CHOLX, CHLST, CHOLV, 487548, HDL, HDLP, LDL, LDLC, DLDLP, 545249, VLDLC, VLDL, TGLX, TRIGL, TRIGP, TGLPOCT, CHHD, CHHDX   BNP No results for input(s): BNPP in the last 72 hours. Liver Enzymes Recent Labs     05/27/20  1431   TP 6.9   ALB 3.6   AP 64      Thyroid Studies Lab Results   Component Value Date/Time    TSH 1.21 02/08/2014 05:10 AM          Radiology:  Ct Head Wo Cont    Result Date: 5/27/2020  EXAM: CT head INDICATION: Fall confusion and weakness COMPARISON: None. TECHNIQUE: Axial CT imaging of the head was performed without intravenous contrast. One or more dose reduction techniques were used on this CT: automated exposure control, adjustment of the mAs and/or kVp according to patient size, and iterative reconstruction techniques. The specific techniques used on this CT exam have been documented in the patient's electronic medical record. Digital Imaging and Communications in Medicine (DICOM) format image data are available to nonaffiliated external healthcare facilities or entities on a secure, media free, reciprocally searchable basis with patient authorization for at least a 12-month period after this study.  _______________ FINDINGS: BRAIN AND POSTERIOR FOSSA: There is age-appropriate atrophy. There is no intracranial hemorrhage, mass effect, or midline shift. There is moderate small vessel ischemic disease. There is mild ventriculomegaly which may be secondary to atrophy. EXTRA-AXIAL SPACES AND MENINGES: There are no abnormal extra-axial fluid collections. CALVARIUM: Intact. SINUSES: Clear. OTHER: None. _______________     IMPRESSION: No acute intracranial abnormalities. Atrophy with small vessel ischemic disease. Mild ventriculomegaly which may be secondary to atrophy. Correlate with clinical findings to exclude normal pressure hydrocephalus. Ct Spine Cerv Wo Cont    Result Date: 5/27/2020  EXAM: CT cervical spine INDICATION: Fall COMPARISON: None. TECHNIQUE: Axial CT imaging of the cervical spine was performed from the skull base to the thoracic inlet without intravenous contrast. Multiplanar reformats were generated. One or more dose reduction techniques were used on this CT: automated exposure control, adjustment of the mAs and/or kVp according to patient size, and iterative reconstruction techniques. The specific techniques used on this CT exam have been documented in the patient's electronic medical record. Digital Imaging and Communications in Medicine (DICOM) format image data are available to nonaffiliated external healthcare facilities or entities on a secure, media free, reciprocally searchable basis with patient authorization for at least a 12-month period after this study. _______________ FINDINGS: VERTEBRAE AND DISCS: Vertebral alignment and articulation are within normal limits. Vertebral body heights are maintained. Mild disc space narrowing present at C5-C6. Specifically, no compression deformities are noted and there is no spondylolisthesis. No displaced fractures are identified. There are no significant areas of bone lucency or sclerosis.  SPINAL CANAL AND FORAMINA: C3-C4 demonstrates broad based posterior disc bulge mildly indenting on the thecal sac. C4-C5 demonstrates posterior and lateral disc osteophyte complex moderately narrowing both neural foramen and resulting in mild spinal stenosis. C5-C6 demonstrates broad based posterior disc bulge resulting in moderate spinal stenosis. PREVERTEBRAL SOFT TISSUES: Normal. VISIBLE PORTIONS OF POSTERIOR FOSSA/BRAIN: Normal. LUNG APICES: Clear. OTHER: There is a large 3.9 x 2.5 x 4.2 cm left neck mass. Advise a dedicated CT of the neck with IV contrast for further evaluation on a nonemergent basis. _______________     IMPRESSION: No acute fractures or listhesis. Broad-based posterior disc bulge at C5-C6 resulting in moderate spinal stenosis. Spondylosis at other levels as described. There is a left-sided neck mass measuring 3.9 x 2.5 x 4.2 cm. Advise a dedicated CT of the neck with IV contrast for further evaluation on nonemergent basis. Ct Spine Lumb Wo Cont    Result Date: 5/27/2020  EXAM: CT of the lumbar spine without contrast INDICATION: Fall confusion weakness COMPARISON: CT abdomen and pelvis dated June 17, 2015 TECHNIQUE: Axial CT imaging of the lumbar spine was performed without intravenous contrast. Multiplanar reformats were generated. One or more dose reduction techniques were used on this CT: automated exposure control, adjustment of the mAs and/or kVp according to patient size, and iterative reconstruction techniques. The specific techniques used on this CT exam have been documented in the patient's electronic medical record. Digital Imaging and Communications in Medicine (DICOM) format image data are available to nonaffiliated external healthcare facilities or entities on a secure, media free, reciprocally searchable basis with patient authorization for at least a 12-month period after this study. _______________ FINDINGS: There is osteopenia. There are no acute fractures. There is minimal grade 1 anterolisthesis at L4-L5 likely degenerative. There is no spondylolysis. There are degenerative changes of both SI joints with vacuum phenomenon. Severe facet arthropathy present at L4-L5 and L5-S1. Paraspinal soft tissues are normal. There is atrophy of both kidneys. Severe calcific atherosclerotic present. The uterus unremarkable for patient's age. Severe calcific atherosclerosis present. 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 Colonic diverticulosis present. Visualized adrenals, liver and spleen are unremarkable. Visualized pancreas is unremarkable. L1-L2 demonstrate significant facet arthropathy and hypertrophy ligamentum flavum resulting in moderate to severe spinal stenosis. L2-L3 demonstrates annular and lateral disc osteophyte complex and facet arthropathy resulting in severe spinal and foraminal stenosis. L3-L4 demonstrates severe facet arthropathy, hypertrophy ligamentum flavum and annular and lateral disc osteophyte complex resulting in severe foraminal and spinal stenosis. L4-L5 interspace changes of grade 1 anterolisthesis along with severe facet arthropathy, hypertrophy ligamentum flavum and annular lateral disc osteophyte complex resulting in severe foraminal stenosis and severe spinal stenosis. _______________     IMPRESSION: Severe spondylosis as described. No acute fractures. Minimal grade 1 anterolisthesis at L4-L5 likely degenerative. Xr Chest Port    Result Date: 5/27/2020  EXAM: CHEST RADIOGRAPH, SINGLE VIEW CLINICAL INDICATION/HISTORY: sob COMPARISON: Single view chest 2/3/2014 TECHNIQUE: Portable frontal view of the chest was obtained. _______________ FINDINGS: SUPPORT DEVICES: None. HEART AND MEDIASTINUM: Patient is undergone sternotomy with aortic valve replacement since previous examination. Heart and pulmonary vascularity are normal. Aorta is calcified. LUNGS AND PLEURAL SPACES: Suboptimal inspiration.   There is an area of discoid atelectasis or scarring in the right lower lung field which is new compared to prior exam. No focal infiltrate, effusion or pneumothorax. BONY THORAX AND SOFT TISSUES: No acute osseous abnormality. _______________     IMPRESSION: 1. Small small area of discoid atelectasis or scarring right lower lung field. No acute infiltrate. ASSESSMENT/IMPRESSION:  The clinical presentation is consistent with evolving vascular dementia and toxic/metabolic encephalopathy. It would be reasonable to evaluate the patient with a neuropsychological testing is outpatient. I don't get the impression of normal pressure hydrocephalus, though it is possible. It would be reasonable to follow the patient regularly. RECOMMENDATIONS:  1. No further recommendations from Neurology at this time. The patient is going to be followed as outpatient  2. Treatment of underlying toxic/metabolic etiologies. Neurology signs off at this time.     Please do not hesitate to return with any questions.    ------------------------------------  Bebe Herring MD  5/28/2020  2:01 PM

## 2020-05-28 NOTE — CONSULTS
RENAL CONSULT  2020    Patient:  Aidee Salinas  :  1940  Gender:  female  MRN #:  [id number]    Consulting Physician:  Edie Vargas DO,  Assessment:    )Principal Problem:    Metabolic encephalopathy (3/19/7594)    Active Problems:    ESRD (end stage renal disease) ([facility] (2014)      Seizure disorder ([facility] (2014)    HTN  S/p CABG  Severe Spinal Canal stenosis  Frequent falls  Confusion and psychosis      Plan:    HD in am  May need neurology in put regarding normal pressure hydrocephalus causing her confusion? Prognosis for spinal canal stenosis? Straight cath with urine culture  Retacrit  calcitrial    History of Present Illness:  Aidee Salinas is a 79y.o. year old female with hx of CAD, HTN, ESRD, S/p TAVR who has had acute confusion with psychosis and frequent fall, weakness now needing wheelchair transport. On  as per , pt started to undress herself on the porch stating she needs to the bathroom. I have asked pt come in for admission of the above. Past Medical History:   Diagnosis Date    Arthritis     CAD (coronary artery disease)     triple bypass    Chronic kidney disease     DIALYSIS M-W-F    Diabetes ([facility]     Hypertension     Renal dialysis status(V45.11)     S/P CABG x 3      Past Surgical History:   Procedure Laterality Date    ABDOMEN SURGERY PROC UNLISTED      gall stones    CARDIAC SURG PROCEDURE UNLIST  AUG 2014    BYPASS-3 WAY    HX OTHER SURGICAL  2015    2 cardiac stents    HX VASCULAR ACCESS      left arm vasc. access    ROB CATHETER  2014          No family history on file.   Allergies   Allergen Reactions    Contrast Agent [Iodine] Rash    Penicillins Hives     Current Facility-Administered Medications   Medication Dose Route Frequency Provider Last Rate Last Dose    acetaminophen (TYLENOL) tablet 650 mg  650 mg Oral Q6H PRN Senia Granados MD        ondansetron (ZOFRAN) injection 4 mg  4 mg IntraVENous Q4H PRN Evi Freeman MD        diphenhydrAMINE (BENADRYL) injection 12.5 mg  12.5 mg IntraVENous Q4H PRN Evi Freeman MD        heparin (porcine) injection 5,000 Units  5,000 Units SubCUTAneous Q8H Evi Freeman MD   Stopped at 05/27/20 1510    levoFLOXacin (LEVAQUIN) 500 mg in D5W IVPB  500 mg IntraVENous Q48H Evi Freeman  mL/hr at 05/27/20 1826 500 mg at 05/27/20 1826       Review of Symptoms:  Pt is not able to provide a good history    Objective:  Visit Vitals  /63   Pulse 91   Temp 97.7 °F (36.5 °C)   Resp 18   Ht 5' 1\" (1.549 m)   Wt 62 kg (136 lb 11.2 oz)   SpO2 100%   BMI 25.83 kg/m²         Well developed and groomed, confused, not oriented to place. Eyes: anicteric, no subconjectival hemorrahge, eye lid without lesion  Ears, nose, mouth and throat without visible mass, scars, lesion  Neck: supple  Respiratory: good effort, no rales, good breath sounds, no wheezings  Cardiovascular:  Normal rate, regular rythem distant S1 S2 no rubs or gallops  GI: soft, nontender  Musculoskeletal: No clubbing cynosis, no petechia  Skin: no rash, lesion or ulcers  Neruoligcal: leg weakness  Psychicatric: oriented to self . No rebeka affect. Non anxiouse. Laboratory Data:  Lab Results   Component Value Date    BUN 15 05/27/2020    BUN 32 (H) 04/11/2017    BUN 19 (H) 06/17/2015     05/27/2020     04/11/2017     06/17/2015    CO2 34 (H) 05/27/2020    CO2 33 (H) 04/11/2017    CO2 30 06/17/2015     Lab Results   Component Value Date    WBC 5.2 05/27/2020    HGB 10.8 (L) 05/27/2020    HCT 34.0 (L) 05/27/2020     Imaging have been reviewed:  )Ct Head Wo Cont    Result Date: 5/27/2020  EXAM: CT head INDICATION: Fall confusion and weakness COMPARISON: None.  TECHNIQUE: Axial CT imaging of the head was performed without intravenous contrast. One or more dose reduction techniques were used on this CT: automated exposure control, adjustment of the mAs and/or kVp according to patient size, and iterative reconstruction techniques. The specific techniques used on this CT exam have been documented in the patient's electronic medical record. Digital Imaging and Communications in Medicine (DICOM) format image data are available to nonaffiliated external healthcare facilities or entities on a secure, media free, reciprocally searchable basis with patient authorization for at least a 12-month period after this study. _______________ FINDINGS: BRAIN AND POSTERIOR FOSSA: There is age-appropriate atrophy. There is no intracranial hemorrhage, mass effect, or midline shift. There is moderate small vessel ischemic disease. There is mild ventriculomegaly which may be secondary to atrophy. EXTRA-AXIAL SPACES AND MENINGES: There are no abnormal extra-axial fluid collections. CALVARIUM: Intact. SINUSES: Clear. OTHER: None. _______________     IMPRESSION: No acute intracranial abnormalities. Atrophy with small vessel ischemic disease. Mild ventriculomegaly which may be secondary to atrophy. Correlate with clinical findings to exclude normal pressure hydrocephalus. Ct Spine Cerv Wo Cont    Result Date: 5/27/2020  EXAM: CT cervical spine INDICATION: Fall COMPARISON: None. TECHNIQUE: Axial CT imaging of the cervical spine was performed from the skull base to the thoracic inlet without intravenous contrast. Multiplanar reformats were generated. One or more dose reduction techniques were used on this CT: automated exposure control, adjustment of the mAs and/or kVp according to patient size, and iterative reconstruction techniques. The specific techniques used on this CT exam have been documented in the patient's electronic medical record.  Digital Imaging and Communications in Medicine (DICOM) format image data are available to nonaffiliated external healthcare facilities or entities on a secure, media free, reciprocally searchable basis with patient authorization for at least a 12-month period after this study. _______________ FINDINGS: VERTEBRAE AND DISCS: Vertebral alignment and articulation are within normal limits. Vertebral body heights are maintained. Mild disc space narrowing present at C5-C6. Specifically, no compression deformities are noted and there is no spondylolisthesis. No displaced fractures are identified. There are no significant areas of bone lucency or sclerosis. SPINAL CANAL AND FORAMINA: C3-C4 demonstrates broad based posterior disc bulge mildly indenting on the thecal sac. C4-C5 demonstrates posterior and lateral disc osteophyte complex moderately narrowing both neural foramen and resulting in mild spinal stenosis. C5-C6 demonstrates broad based posterior disc bulge resulting in moderate spinal stenosis. PREVERTEBRAL SOFT TISSUES: Normal. VISIBLE PORTIONS OF POSTERIOR FOSSA/BRAIN: Normal. LUNG APICES: Clear. OTHER: There is a large 3.9 x 2.5 x 4.2 cm left neck mass. Advise a dedicated CT of the neck with IV contrast for further evaluation on a nonemergent basis. _______________     IMPRESSION: No acute fractures or listhesis. Broad-based posterior disc bulge at C5-C6 resulting in moderate spinal stenosis. Spondylosis at other levels as described. There is a left-sided neck mass measuring 3.9 x 2.5 x 4.2 cm. Advise a dedicated CT of the neck with IV contrast for further evaluation on nonemergent basis. Ct Spine Lumb Wo Cont    Result Date: 5/27/2020  EXAM: CT of the lumbar spine without contrast INDICATION: Fall confusion weakness COMPARISON: CT abdomen and pelvis dated June 17, 2015 TECHNIQUE: Axial CT imaging of the lumbar spine was performed without intravenous contrast. Multiplanar reformats were generated. One or more dose reduction techniques were used on this CT: automated exposure control, adjustment of the mAs and/or kVp according to patient size, and iterative reconstruction techniques.   The specific techniques used on this CT exam have been documented in the patient's electronic medical record. Digital Imaging and Communications in Medicine (DICOM) format image data are available to nonaffiliated external healthcare facilities or entities on a secure, media free, reciprocally searchable basis with patient authorization for at least a 12-month period after this study. _______________ FINDINGS: There is osteopenia. There are no acute fractures. There is minimal grade 1 anterolisthesis at L4-L5 likely degenerative. There is no spondylolysis. There are degenerative changes of both SI joints with vacuum phenomenon. Severe facet arthropathy present at L4-L5 and L5-S1. Paraspinal soft tissues are normal. There is atrophy of both kidneys. Severe calcific atherosclerotic present. The uterus unremarkable for patient's age. Severe calcific atherosclerosis present. 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 Colonic diverticulosis present. Visualized adrenals, liver and spleen are unremarkable. Visualized pancreas is unremarkable. L1-L2 demonstrate significant facet arthropathy and hypertrophy ligamentum flavum resulting in moderate to severe spinal stenosis. L2-L3 demonstrates annular and lateral disc osteophyte complex and facet arthropathy resulting in severe spinal and foraminal stenosis. L3-L4 demonstrates severe facet arthropathy, hypertrophy ligamentum flavum and annular and lateral disc osteophyte complex resulting in severe foraminal and spinal stenosis. L4-L5 interspace changes of grade 1 anterolisthesis along with severe facet arthropathy, hypertrophy ligamentum flavum and annular lateral disc osteophyte complex resulting in severe foraminal stenosis and severe spinal stenosis. _______________     IMPRESSION: Severe spondylosis as described. No acute fractures. Minimal grade 1 anterolisthesis at L4-L5 likely degenerative.     Xr Chest Port    Result Date: 5/27/2020  EXAM: CHEST RADIOGRAPH, SINGLE VIEW CLINICAL INDICATION/HISTORY: sob COMPARISON: Single view chest 2/3/2014 TECHNIQUE: Portable frontal view of the chest was obtained. _______________ FINDINGS: SUPPORT DEVICES: None. HEART AND MEDIASTINUM: Patient is undergone sternotomy with aortic valve replacement since previous examination. Heart and pulmonary vascularity are normal. Aorta is calcified. LUNGS AND PLEURAL SPACES: Suboptimal inspiration. There is an area of discoid atelectasis or scarring in the right lower lung field which is new compared to prior exam. No focal infiltrate, effusion or pneumothorax. BONY THORAX AND SOFT TISSUES: No acute osseous abnormality. _______________     IMPRESSION: 1. Small small area of discoid atelectasis or scarring right lower lung field. No acute infiltrate.       Total time spent today and yesterday 61 minutes    )Emelyn Prakash DO,

## 2020-05-28 NOTE — PROGRESS NOTES
Problem: Mobility Impaired (Adult and Pediatric)  Goal: *Acute Goals and Plan of Care (Insert Text)  Description: Physical Therapy Goals   Initiated 5/28/2020 and to be accomplished within 5 day(s)  1. Patient will move from supine <> sit with S in prep for out of bed activity and change of position. 2.  Patient will perform sit<> stand with S with LRAD in prep for transfers/ambulation. 3.  Patient will transfer from bed <> chair with S with LRAD for time up in chair for completion of ADL activity. 4.  Patient will ambulate 100 feet with LRAD/S for improved functional mobility/safe discharge. 5.  Patient will ascend/descend flight of stairs with handrail(s) with minimal assistance/contact guard assist for home re-entry as needed. Outcome: Progressing Towards Goal  PHYSICAL THERAPY EVALUATION    Patient: Uma Teague (36 y.o. female)  Date: 5/28/2020  Primary Diagnosis: Metabolic encephalopathy [N01.95]  Precautions: Fall    ASSESSMENT :  Based on the objective data described below, the patient is 78 yo R seen on medical unit, found seated EOB. Pt confused, oriented to person only. Pt presents with decreased ROM/motor performance LE's (LUE with HD access), with decreased independence in overall functional mobility and with decreased activity tolerance. Pt STS with supervision/SBA, and participated in GT/RW/44ft with CGA. Andreia slow, c/o R knee and back arthritic pain. Appears fatigued. Pt returned to room, sit >supine with SBA/additional time and left with all needs in reach, bed alarm engaged and [facility] notified. Pt primary limiting factor is confusion at this time. Nurse aware pt bed alarm on. Recommend HHPT vs SNF for follow up physical therapy upon discharge to reach maximal level of independence/safety with functional mobility.      Pt Education: Role of physical therapy in acute care setting, fall prevention and safety/technique during functional mobility tasks      Patient will benefit from skilled intervention to address the above impairments. Patients rehabilitation potential is considered to be Good  Factors which may influence rehabilitation potential include:   []         None noted  [x]         Mental ability/status  [x]         Medical condition  []         Home/family situation and support systems  []         Safety awareness  []         Pain tolerance/management  []         Other:      PLAN :  Recommendations and Planned Interventions:  [x]           Bed Mobility Training             []    Neuromuscular Re-Education  [x]           Transfer Training                   []    Orthotic/Prosthetic Training  [x]           Gait Training                          []    Modalities  [x]           Therapeutic Exercises          []    Edema Management/Control  [x]           Therapeutic Activities            [x]    Patient and Family Training/Education  []           Other (comment):    Frequency/Duration: Patient will be followed by physical therapy 1-2 times per day to address goals. Discharge Recommendations: Home Health vs [address] Recommendations for Discharge: N/A     SUBJECTIVE:   Patient stated I'm okay, just a little tired. When everybody leaves, I'm gonna run some water and take a bath.     OBJECTIVE DATA SUMMARY:     Past Medical History:   Diagnosis Date    Arthritis     CAD (coronary artery disease) 2014    triple bypass    Chronic kidney disease     DIALYSIS M-W-F    Diabetes ([facility]     Hypertension     Renal dialysis status(V45.11)     S/P CABG x 3      Past Surgical History:   Procedure Laterality Date    ABDOMEN SURGERY PROC UNLISTED  1976    gall stones    CARDIAC SURG PROCEDURE UNLIST  AUG 2014    BYPASS-3 WAY    HX OTHER SURGICAL  1/2015    2 cardiac stents    HX VASCULAR ACCESS      left arm vasc.  access    ROB CATHETER  2/1/2014          Barriers to Learning/Limitations: yes;  altered mental status (i.e.Sedation, Confusion)  Compensate with: Visual Cues, Verbal Cues and Tactile Cues  Prior Level of Function/Home Situation: amb with QC by pt report (though pt confused)  Home Situation  Home Environment: Private residence  One/Two Story Residence: Two story  Living Alone: No  Support Systems: Spouse/Significant Other/Partner  Patient Expects to be Discharged to[de-identified] Private residence  Current DME Used/Available at Home: Hannah Piles, quad, Walker, rolling  Critical Behavior:  Neurologic State: Alert;Confused  Orientation Level: Oriented to person  Cognition: Decreased attention/concentration; Follows commands  Safety/Judgement: Decreased awareness of environment;Lack of insight into deficits  Psychosocial  Patient Behaviors: Calm;Confused; Cooperative  Purposeful Interaction: Yes  Pt Identified Daily Priority: Clinical issues (comment)  Caritas Process: Create healing environment  Caring Interventions: Reassure  Reassure: Caring rounds  Skin Condition/Temp: Dry;Warm  Skin Integrity: Intact  Skin Integumentary  Skin Color: Appropriate for ethnicity  Skin Condition/Temp: Dry;Warm  Skin Integrity: Intact  Turgor: Epidermis thin w/ loss of subcut tissue  Strength:    Strength: Generally decreased, functional  Tone & Sensation:   Tone: Normal  Sensation: Intact  Range Of Motion:  AROM: Generally decreased, functional  PROM: Generally decreased, functional  Functional Mobility:  Bed Mobility:  Sit to Supine: Stand-by assistance; Additional time  Scooting: Stand-by assistance  Transfers:  Sit to Stand: Supervision;Stand-by assistance  Stand to Sit: Supervision;Stand-by assistance  Balance:   Sitting: Intact  Standing: Intact; With support  Ambulation/Gait Training:  Distance (ft): 44 Feet (ft)  Assistive Device: Gait belt;Walker, rolling  Ambulation - Level of Assistance: Contact guard assistance  Gait Description (WDL): Exceptions to WDL  Gait Abnormalities: Decreased step clearance; Antalgic  Speed/Andreia: Slow  Step Length: Right shortened;Left shortened  Interventions: Safety awareness training;Verbal cues  Pain:  Pain Scale 1: Numeric (0 - 10)  Pain Intensity 1: 0  Activity Tolerance:   Fair   Please refer to the flowsheet for vital signs taken during this treatment. After treatment:   []         Patient left in no apparent distress sitting up in chair  [x]         Patient left in no apparent distress in bed  [x]         Call bell left within reach  [x]         Nursing notified-Kell  []         Caregiver present  [x]         Bed alarm activated    COMMUNICATION/EDUCATION:   []         Fall prevention education was provided and the patient/caregiver indicated understanding. []         Patient/family have participated as able in goal setting and plan of care. []         Patient/family agree to work toward stated goals and plan of care. []         Patient understands intent and goals of therapy, but is neutral about his/her participation. [x]         Patient is unable to participate in goal setting and plan of care.     Eval Complexity: History: HIGH Complexity :3+ comorbidities / personal factors will impact the outcome/ POC Exam:MEDIUM Complexity : 3 Standardized tests and measures addressing body structure, function, activity limitation and / or participation in recreation  Presentation: HIGH Complexity : Unstable and unpredictable characteristics  Clinical Decision Making:Medium Complexity  Overall Complexity:MEDIUM    Thank you for this referral.  Jorje Steward, PT   Time Calculation: 29 mins

## 2020-05-28 NOTE — ROUTINE PROCESS
Bedside and Verbal shift change report given to RENAE Rivera RN  (oncoming nurse) by  LASHAUN Gross RN  (offgoing nurse). Report included the following information SBAR, Kardex, Intake/Output and MAR.

## 2020-05-28 NOTE — PROGRESS NOTES
Hospitalist Progress Note    Patient: Kenji Alan MRN: [id number]  CSN: [id number]    YOB: 1940  Age: 79 y.o. Sex: female    DOA: 5/27/2020 LOS:  LOS: 1 day                Assessment/Plan     Patient Active Problem List   Diagnosis Code    ESRD (end stage renal disease) ([facility] N18.6    Seizure disorder ([facility] G40.909    HTN (hypertension) I10    PVD (peripheral vascular disease) ([facility] K48.4    Metabolic encephalopathy C80.45            79-year-old female with history of end-stage renal disease, diabetes, CAD, AVR is admitted from her nephrology office with concerns of confusion and forgetfulness. Concern for normal pressure hydrocephalus. \"I do not know why I am here\"    Metabolic encephalopathy-  Seen by neurology, no further testing recommended, symptoms likely representative of dementia. Neuropsychiatric testing as outpatient. Possible UTI-  Continue Levaquin, follow urine cultures. ESRD-  HD per nephrology. HTN-  Continue home medications, monitor blood pressure. DVT prophylaxis with heparin        Disposition : 1-2 days    Review of systems  General: No fevers or chills. Cardiovascular: No chest pain or pressure. No palpitations. Pulmonary: No shortness of breath. Gastrointestinal: No nausea, vomiting. Physical Exam:  General: Awake, cooperative, no acute distress    HEENT: NC, Atraumatic. PERRLA, anicteric sclerae. Lungs: CTA Bilaterally. No Wheezing/Rhonchi/Rales. Heart:  S1 S2,   No Rubs, No Gallops  Abdomen: Soft, Non distended, Non tender.  +Bowel sounds,   Extremities: No c/c/e  Psych:   Not anxious or agitated. Neurologic:  No acute neurological deficit. Vital signs/Intake and Output:  Visit Vitals  /61   Pulse 92   Temp 97.3 °F (36.3 °C)   Resp 17   Ht 5' 1\" (1.549 m)   Wt 62 kg (136 lb 11.2 oz)   SpO2 100%   BMI 25.83 kg/m²     Current Shift:  No intake/output data recorded.   Last three shifts:  05/26 1901 - 05/28 0700  In: 100 [I.V.:100]  Out: -             Labs: Results:       Chemistry Recent Labs     05/27/20  1431   *      K 3.2*      CO2 34*   BUN 15   CREA 4.50*   CA 9.3  9.2   AGAP 4   BUCR 3*   AP 64   TP 6.9   ALB 3.6   GLOB 3.3   AGRAT 1.1      CBC w/Diff Recent Labs     05/27/20  1431   WBC 5.2   RBC 3.41*   HGB 10.8*   HCT 34.0*   PLT 98*   GRANS 74*   LYMPH 18*   EOS 2      Cardiac Enzymes Recent Labs     05/27/20  2130 05/27/20  1431   CPK 48 50   CKND1 CALCULATION NOT PERFORMED WHEN RESULT IS BELOW LINEAR LIMIT 2.4      Coagulation No results for input(s): PTP, INR, APTT, INREXT in the last 72 hours. Lipid Panel No results found for: CHOL, CHOLPOCT, CHOLX, CHLST, CHOLV, 163296, HDL, HDLP, LDL, LDLC, DLDLP, 411272, VLDLC, VLDL, TGLX, TRIGL, TRIGP, TGLPOCT, CHHD, CHHDX   BNP No results for input(s): BNPP in the last 72 hours.    Liver Enzymes Recent Labs     05/27/20  1431   TP 6.9   ALB 3.6   AP 64      Thyroid Studies Lab Results   Component Value Date/Time    TSH 1.21 02/08/2014 05:10 AM        Procedures/imaging: see electronic medical records for all procedures/Xrays and details which were not copied into this note but were reviewed prior to creation of Plan

## 2020-05-29 VITALS
SYSTOLIC BLOOD PRESSURE: 164 MMHG | HEIGHT: 61 IN | DIASTOLIC BLOOD PRESSURE: 77 MMHG | OXYGEN SATURATION: 96 % | WEIGHT: 135.58 LBS | BODY MASS INDEX: 25.6 KG/M2 | HEART RATE: 99 BPM | RESPIRATION RATE: 16 BRPM | TEMPERATURE: 98 F

## 2020-05-29 PROBLEM — R22.1 MASS OF LEFT SIDE OF NECK: Status: ACTIVE | Noted: 2020-05-29

## 2020-05-29 PROBLEM — M48.02 STENOSIS OF CERVICAL SPINE: Status: ACTIVE | Noted: 2020-05-29

## 2020-05-29 LAB
ALBUMIN SERPL-MCNC: 3.1 G/DL (ref 3.4–5)
ALBUMIN/GLOB SERPL: 1.1 {RATIO} (ref 0.8–1.7)
ALP SERPL-CCNC: 50 U/L (ref 45–117)
ALT SERPL-CCNC: 16 U/L (ref 13–56)
AMMONIA PLAS-SCNC: 19 UMOL/L (ref 11–32)
ANION GAP SERPL CALC-SCNC: 8 MMOL/L (ref 3–18)
AST SERPL-CCNC: 26 U/L (ref 10–38)
BASOPHILS # BLD: 0 K/UL (ref 0–0.1)
BASOPHILS NFR BLD: 0 % (ref 0–2)
BILIRUB SERPL-MCNC: 1 MG/DL (ref 0.2–1)
BUN SERPL-MCNC: 31 MG/DL (ref 7–18)
BUN/CREAT SERPL: 4 (ref 12–20)
CALCIUM SERPL-MCNC: 9.4 MG/DL (ref 8.5–10.1)
CHLORIDE SERPL-SCNC: 106 MMOL/L (ref 100–111)
CO2 SERPL-SCNC: 28 MMOL/L (ref 21–32)
CREAT SERPL-MCNC: 7.35 MG/DL (ref 0.6–1.3)
DIFFERENTIAL METHOD BLD: ABNORMAL
EOSINOPHIL # BLD: 0.1 K/UL (ref 0–0.4)
EOSINOPHIL NFR BLD: 2 % (ref 0–5)
ERYTHROCYTE [DISTWIDTH] IN BLOOD BY AUTOMATED COUNT: 17.6 % (ref 11.6–14.5)
GLOBULIN SER CALC-MCNC: 2.9 G/DL (ref 2–4)
GLUCOSE SERPL-MCNC: 138 MG/DL (ref 74–99)
HBV SURFACE AB SER QL IA: POSITIVE
HBV SURFACE AB SERPL IA-ACNC: 94.26 MIU/ML
HBV SURFACE AG SER QL: 0.27 INDEX
HBV SURFACE AG SER QL: NEGATIVE
HCT VFR BLD AUTO: 31.5 % (ref 35–45)
HEP BS AB COMMENT,HBSAC: NORMAL
HGB BLD-MCNC: 9.9 G/DL (ref 12–16)
LACTATE SERPL-SCNC: 1.6 MMOL/L (ref 0.4–2)
LYMPHOCYTES # BLD: 0.7 K/UL (ref 0.9–3.6)
LYMPHOCYTES NFR BLD: 9 % (ref 21–52)
MAGNESIUM SERPL-MCNC: 2.1 MG/DL (ref 1.6–2.6)
MCH RBC QN AUTO: 31.9 PG (ref 24–34)
MCHC RBC AUTO-ENTMCNC: 31.4 G/DL (ref 31–37)
MCV RBC AUTO: 101.6 FL (ref 74–97)
MONOCYTES # BLD: 0.4 K/UL (ref 0.05–1.2)
MONOCYTES NFR BLD: 5 % (ref 3–10)
NEUTS SEG # BLD: 5.8 K/UL (ref 1.8–8)
NEUTS SEG NFR BLD: 84 % (ref 40–73)
PHOSPHATE SERPL-MCNC: 2.8 MG/DL (ref 2.5–4.9)
PLATELET # BLD AUTO: 89 K/UL (ref 135–420)
POTASSIUM SERPL-SCNC: 4 MMOL/L (ref 3.5–5.5)
PROT SERPL-MCNC: 6 G/DL (ref 6.4–8.2)
RBC # BLD AUTO: 3.1 M/UL (ref 4.2–5.3)
SODIUM SERPL-SCNC: 142 MMOL/L (ref 136–145)
WBC # BLD AUTO: 7 K/UL (ref 4.6–13.2)

## 2020-05-29 PROCEDURE — 82140 ASSAY OF AMMONIA: CPT

## 2020-05-29 PROCEDURE — 83605 ASSAY OF LACTIC ACID: CPT

## 2020-05-29 PROCEDURE — 36415 COLL VENOUS BLD VENIPUNCTURE: CPT

## 2020-05-29 PROCEDURE — 86704 HEP B CORE ANTIBODY TOTAL: CPT

## 2020-05-29 PROCEDURE — 83735 ASSAY OF MAGNESIUM: CPT

## 2020-05-29 PROCEDURE — 90935 HEMODIALYSIS ONE EVALUATION: CPT

## 2020-05-29 PROCEDURE — 74011250636 HC RX REV CODE- 250/636: Performed by: INTERNAL MEDICINE

## 2020-05-29 PROCEDURE — 74011250637 HC RX REV CODE- 250/637: Performed by: INTERNAL MEDICINE

## 2020-05-29 PROCEDURE — 84100 ASSAY OF PHOSPHORUS: CPT

## 2020-05-29 PROCEDURE — 86706 HEP B SURFACE ANTIBODY: CPT

## 2020-05-29 PROCEDURE — 97530 THERAPEUTIC ACTIVITIES: CPT

## 2020-05-29 PROCEDURE — 97116 GAIT TRAINING THERAPY: CPT

## 2020-05-29 PROCEDURE — 74011250637 HC RX REV CODE- 250/637: Performed by: HOSPITALIST

## 2020-05-29 PROCEDURE — 80053 COMPREHEN METABOLIC PANEL: CPT

## 2020-05-29 PROCEDURE — 87340 HEPATITIS B SURFACE AG IA: CPT

## 2020-05-29 PROCEDURE — 85025 COMPLETE CBC W/AUTO DIFF WBC: CPT

## 2020-05-29 PROCEDURE — 5A1D70Z PERFORMANCE OF URINARY FILTRATION, INTERMITTENT, LESS THAN 6 HOURS PER DAY: ICD-10-PCS | Performed by: INTERNAL MEDICINE

## 2020-05-29 RX ORDER — CALCIUM ACETATE 667 MG/1
2 CAPSULE ORAL 2 TIMES DAILY WITH MEALS
Status: DISCONTINUED | OUTPATIENT
Start: 2020-05-29 | End: 2020-05-29 | Stop reason: HOSPADM

## 2020-05-29 RX ADMIN — CALCIUM ACETATE 1334 MG: 667 CAPSULE ORAL at 08:15

## 2020-05-29 RX ADMIN — ACETAMINOPHEN 650 MG: 325 TABLET ORAL at 13:19

## 2020-05-29 RX ADMIN — LEVOFLOXACIN 500 MG: 5 INJECTION, SOLUTION INTRAVENOUS at 17:29

## 2020-05-29 RX ADMIN — CALCIUM ACETATE 1334 MG: 667 CAPSULE ORAL at 17:28

## 2020-05-29 RX ADMIN — SEVELAMER CARBONATE 800 MG: 800 TABLET, FILM COATED ORAL at 08:15

## 2020-05-29 RX ADMIN — ATORVASTATIN CALCIUM 10 MG: 10 TABLET, FILM COATED ORAL at 08:14

## 2020-05-29 RX ADMIN — ACETAMINOPHEN 650 MG: 325 TABLET ORAL at 02:39

## 2020-05-29 RX ADMIN — ONDANSETRON 4 MG: 2 INJECTION INTRAMUSCULAR; INTRAVENOUS at 02:40

## 2020-05-29 RX ADMIN — EPOETIN ALFA-EPBX 3000 UNITS: 3000 INJECTION, SOLUTION INTRAVENOUS; SUBCUTANEOUS at 15:58

## 2020-05-29 RX ADMIN — LOSARTAN POTASSIUM 100 MG: 50 TABLET, FILM COATED ORAL at 08:15

## 2020-05-29 RX ADMIN — ASPIRIN 81 MG 81 MG: 81 TABLET ORAL at 08:15

## 2020-05-29 RX ADMIN — SEVELAMER CARBONATE 800 MG: 800 TABLET, FILM COATED ORAL at 17:28

## 2020-05-29 NOTE — PROGRESS NOTES
Problem: Mobility Impaired (Adult and Pediatric)  Goal: *Acute Goals and Plan of Care (Insert Text)  Description: Physical Therapy Goals   Initiated 5/28/2020 and to be accomplished within 5 day(s)  1. Patient will move from supine <> sit with S in prep for out of bed activity and change of position. 2.  Patient will perform sit<> stand with S with LRAD in prep for transfers/ambulation. 3.  Patient will transfer from bed <> chair with S with LRAD for time up in chair for completion of ADL activity. 4.  Patient will ambulate 100 feet with LRAD/S for improved functional mobility/safe discharge. 5.  Patient will ascend/descend flight of stairs with handrail(s) with minimal assistance/contact guard assist for home re-entry as needed. Note:   PHYSICAL THERAPY TREATMENT    Patient: Karen Mccartney (50 y.o. female)  Date: 5/29/2020  Diagnosis: Metabolic encephalopathy [F50.49]   Metabolic encephalopathy  Precautions: Fall   Chart, physical therapy assessment, plan of care and goals were reviewed. ASSESSMENT:  Pt required SB-CGA for bed mobility, transfers, and amb 50 ft c/RW. Pt does require extra time to complete all tasks and verbal and tactile cuing to accomplish task due to confusion and decreased processing. Pt will require supervision and cuing at home to assist with safe mobility. Will continue to progress as pt tolerates. Progression toward goals:  [x]      Improving appropriately and progressing toward goals  []      Improving slowly and progressing toward goals  []      Not making progress toward goals and plan of care will be adjusted     PLAN:  Patient continues to benefit from skilled intervention to address the above impairments. Continue treatment per established plan of care. Discharge Recommendations:  Home Health with 24 hour supervision/assist  Further Equipment Recommendations for Discharge:  N/A     SUBJECTIVE:   Patient stated I am ready to go home.     OBJECTIVE DATA SUMMARY: Critical Behavior:  Neurologic State: Alert  Orientation Level: Disoriented to person, Disoriented to place, Disoriented to time  Cognition: Appropriate for age attention/concentration  Safety/Judgement: Decreased awareness of environment, Lack of insight into deficits  Functional Mobility Training:  Bed Mobility:   Supine to Sit: Stand-by assistance; Additional time(verbal cuing)  Sit to Supine: Stand-by assistance; Additional time(verbal cues)  Transfers:  Sit to Stand: Supervision;Stand-by assistance  Stand to Sit: Supervision;Stand-by assistance  Balance:  Sitting: Intact  Standing: Intact; With support  Ambulation/Gait Training:  Distance (ft): 50 Feet (ft)  Assistive Device: Gait belt;Walker, rolling  Ambulation - Level of Assistance: Contact guard assistance  Gait Abnormalities: Decreased step clearance  Speed/Andreia: Slow  Step Length: Right shortened;Left shortened  Interventions: Safety awareness training; Tactile cues; Verbal cues; Visual/Demos  Pain:  Pain Scale 1: Numeric (0 - 10)  Pain Intensity 1: 0  Activity Tolerance:   Good  Please refer to the flowsheet for vital signs taken during this treatment.   After treatment:   [] Patient left in no apparent distress sitting up in chair  [x] Patient left in no apparent distress in bed  [x] Call bell left within reach  [x] Nursing notified  [] Caregiver present  [x] Bed alarm activated      [place] Carlitos Abad   Time Calculation: 23 mins

## 2020-05-29 NOTE — PROGRESS NOTES
Intervention: Monitor/Manage Fluid Electrolyte/Acid Base Balance    PROBLEM: HEMODIALYSIS ADULT    INTERVENTION: Hemodynamic stabilization  Maintain BP WNL for this patient while on hemodialysis    INTERVENTION: Fluid Management  Will attempt 1500 ml total fluid removal as tolerated    INTERVENTION: Metabolic / Electrolyte Imbalance Management  K+ 2 potassium bath today with dialysis    GOAL: Signs and symptoms of listed potential problems will be absent or manageable  (reference Hemodialysis ADULT CPG)    OUTCOME: Progressing  HD planned for 3 hours today

## 2020-05-29 NOTE — PROGRESS NOTES
0817-Pt assessed see flowsheet ordered breakfast.    1036-Took one bite of a sausage said she doesn't have an appetite. 1224-MD said can leave after dialysis. 1241-Paged MD Michell Dorsey to inquire if pt needed an abx to go home with for her UTI.    1246-Spoke to pharmacist about total doses of levaquin, pharmacist said that pt doesn't get usual dosage due to dialysis. 1300-MD Michell Dorsey said make sure pt gets todays dose of levaquin post dialysis, prior to discharge today, doesn't need any more doses after that. 1418-Pt having dialysis. 1449-Called  to inform him pt would be discharged home today after completion of dialysis session and final abx.    1840-This writer has reviewed discharge instructions with patient at this time. Patient has verbalized understanding. Patient was provided with care notes to include side effects of RX's. Arm bands removed and shredded. AVS reviewed with aniyah ROJAS.

## 2020-05-29 NOTE — DIALYSIS
Pt in bed, a+o x 4, no s/s of distress noted. AVF left arm accessed per protocol. Flushed with 10cc of NS with ease. Tx initiated at 1400, AVF flowing with ease. For hemodynamic stability UF goal 1500ml initiated. Tx completed at 1700, tolerated wel. 1500ML removed. De-accessed per protocol. Clot time 5 minutes for arterial, and 5 minutes for venous. Unit nurse given report.

## 2020-05-29 NOTE — PROGRESS NOTES
DC Plan: Discharge home with [facility], MD follow up, family assistance. Needs dialysis prior to discharge. Chart reviewed. Pt admitted to medical by hospitalist. Attended morning IDRs and met with pt at bedside along with MD Judie Mcardle. PT has seen pt today and rec HH w family assistance. Pt will dc home today. Called pts  [place] to discuss dc plan. FOC offered for [place]. Pts  chose [facility], referral placed. Pt has RW, cane. Pts home address confirmed per face sheet. Pts PCP confirmed as MD Harvey. Pt goes to [facility] for HD on MWF @ 5A.  will drive home at discharge. Pt lives with , son and granddaughter. CM will cont to follow for transition of care needs [phone]    Care Management Interventions  PCP Verified by CM: Yes  Mode of Transport at Discharge:  Other (see comment)()  Transition of Care Consult (CM Consult): Discharge Planning, [address]: No  Reason [place]: Patient already serviced by other home care/hospice agency  Health Maintenance Reviewed: Yes  Physical Therapy Consult: Yes  Current Support Network: Lives with Spouse  Confirm Follow Up Transport: Family  The Plan for Transition of Care is Related to the Following Treatment Goals : home with [place]  The Patient and/or Patient Representative was Provided with a Choice of Provider and Agrees with the Discharge Plan?: Yes  Name of the Patient Representative Who was Provided with a Choice of Provider and Agrees with the Discharge Plan:   Freedom of Choice List was Provided with Basic Dialogue that Supports the Patient's Individualized Plan of Care/Goals, Treatment Preferences and Shares the Quality Data Associated with the Providers?: Yes  Discharge Location  Discharge Placement: Home with home health

## 2020-05-29 NOTE — PROGRESS NOTES
AMD  At [facility] addressed [address] with the patient. Patient stated that he/she has one at home. Patient was encouraged to have one brought in for scanning.  completed visit with patient and offered Pastoral care. Chaplains will continue to follow and will provide pastoral care as needed or requested.       Sister Milo Lopez, [place], [address]  [phone]

## 2020-05-29 NOTE — PROGRESS NOTES
Assessment:      encephalopathy (5/27/2020)         ESRD (end stage renal disease) ([facility] [address] (2/11/2014)       Seizure disorder ([facility] [address] (2/11/2014)     HTN  S/p CABG  Severe Spinal Canal stenosis  Frequent falls  Confusion and psychosis        Plan:    HD today,   Follow  Up with spine surgeons, but high risk for surgery overall. CC: ESRD  Interval History: PT is about the same      Subjective:   No change since i saw pt last. No new symptoms or issues. Review of Systems:  Negative for Edema, SOB        Blood pressure 168/46, pulse 84, temperature 98 °F (36.7 °C), resp. rate 17, height 5' 1\" (1.549 m), weight 61.5 kg (135 lb 9.3 oz), SpO2 96 %.       awake and alert   NAD  No edema    Intake/Output Summary (Last 24 hours) at 5/29/2020 1325  Last data filed at 5/29/2020 1036  Gross per 24 hour   Intake 30 ml   Output    Net 30 ml      Recent Labs     05/29/20  0357   WBC 7.0     Lab Results   Component Value Date/Time    Sodium 142 05/29/2020 03:57 AM    Potassium 4.0 05/29/2020 03:57 AM    Chloride 106 05/29/2020 03:57 AM    CO2 28 05/29/2020 03:57 AM    Anion gap 8 05/29/2020 03:57 AM    Glucose 138 (H) 05/29/2020 03:57 AM    BUN 31 (H) 05/29/2020 03:57 AM    Creatinine 7.35 (H) 05/29/2020 03:57 AM    BUN/Creatinine ratio 4 (L) 05/29/2020 03:57 AM    GFR est AA 6 (L) 05/29/2020 03:57 AM    GFR est non-AA 5 (L) 05/29/2020 03:57 AM    Calcium 9.4 05/29/2020 03:57 AM        Current Facility-Administered Medications   Medication Dose Route Frequency Provider Last Rate Last Dose    calcium acetate(phosphat bind) (PHOSLO) capsule 1,334 mg  2 Cap Oral BID WITH MEALS Maryann Freeman MD   1,334 mg at 05/29/20 0815    aspirin chewable tablet 81 mg  81 mg Oral DAILY Maryann Freeman MD   81 mg at 05/29/20 0815    atorvastatin (LIPITOR) tablet 10 mg  10 mg Oral DAILY Maryann Freeman MD   10 mg at 05/29/20 0814    losartan (COZAAR) tablet 100 mg  100 mg Oral DAILY Maryann Freeman MD 100 mg at 05/29/20 0815    sevelamer carbonate (RENVELA) tab 800 mg  800 mg Oral TID WITH MEALS Baldev Freeman MD   Stopped at 05/29/20 1200    acetaminophen (TYLENOL) tablet 650 mg  650 mg Oral Q6H PRN Bebe Stevens MD   650 mg at 05/29/20 1319    epoetin ravi-epbx (RETACRIT) injection 3,000 Units  3,000 Units SubCUTAneous Q MON, WED & FRI Dwain, Emelyn R, DO        ondansetron (ZOFRAN) injection 4 mg  4 mg IntraVENous Q4H PRN Baldev Freeman MD   4 mg at 05/29/20 0240    diphenhydrAMINE (BENADRYL) injection 12.5 mg  12.5 mg IntraVENous Q4H PRN Baldev Freeman MD        heparin (porcine) injection 5,000 Units  5,000 Units SubCUTAneous Q8H Baldev Freeman MD   Stopped at 05/27/20 1510    levoFLOXacin (LEVAQUIN) 500 mg in D5W IVPB  500 mg IntraVENous Q48H Baldev Freeman  mL/hr at 05/27/20 1826 500 mg at 05/27/20 1826     Total spent 31 minutes

## 2020-05-29 NOTE — DISCHARGE INSTRUCTIONS
Patient Education        Learning About Metabolic Encephalopathy  What is metabolic encephalopathy? Metabolic encephalopathy is a problem in the brain. It is caused by a chemical imbalance in the blood. The imbalance is caused by an illness or organs that are not working as well as they should. It is not caused by a head injury. When the imbalance affects the brain, it can lead to personality changes. It can also make it harder to think clearly and remember things. The problems may only last a short time if you get treatment right away. But this depends on the cause. If the imbalance has been building up because you've been sick for a long time, the mental changes may be more severe. They may also last longer. What happens when you have this problem? When things are working right, your body has many ways to keep the chemicals in your blood in balance. For example, your liver and kidneys remove waste from your blood. The kidneys also help keep fluids and sodium in balance. And your pancreas makes insulin. It is a hormone that helps control the amount of sugar in your blood. But the chemicals in your blood can get out of balance and damage parts of your body because of a medical problem. This may be kidney or liver failure. Or it could be diabetes that isn't controlled well. When the imbalance affects the brain, normal thinking and behavior can change. What are the symptoms? Symptoms may include:  · Confusion. · Problems with thinking and remembering. · Being grouchy and depressed. · Feeling drowsy. · Not being able to sleep. · Passing out (fainting) now and then. How is it treated? The doctor will try to find the illness that's causing the problem. He or she may ask questions about your past health. The doctor will also do tests to find what is causing the chemical imbalance and to see how severe it is. The doctor may treat the organ system that's causing the problem.  For example, if it's a kidney problem, you may have treatment to help your kidneys work better. If you have an infection, you may need antibiotics. If the doctor can't treat the cause of the problem, he or she will treat the symptoms. The doctor will carefully watch your blood chemicals to make sure that your treatment is being done safely. Follow-up care is a key part of your treatment and safety. Be sure to make and go to all appointments, and call your doctor if you are having problems. It's also a good idea to know your test results and keep a list of the medicines you take. Where can you learn more? Go to http://gary-alexandra.info/  Enter Z998 in the search box to learn more about \"Learning About Metabolic Encephalopathy. \"  Current as of: August 12, 2019               Content Version: 12.5  © 5431-3037 [employer], [employer]. Care instructions adapted under license by [employer] (which disclaims liability or warranty for this information). If you have questions about a medical condition or this instruction, always ask your healthcare professional. [address] any warranty or liability for your use of this information.

## 2020-05-29 NOTE — PROGRESS NOTES
Pt in stable condition. No s/s of acute distress noted/vocied. Plan of care discussed, verbalized understanding. Comfort and care given. All safety and comfort measures in place. Remote tele in place -SR  Lethargic, oriented to self, follows command, decreased attention/concentration and poor safety awareness. Bed alarm in uses. Seizure precaution in place. Left limp precaution. AVG dressing CDI. Platelets <868 - Bleeding precaution in place. 0303:  Bright red vaginal bleeding noted. Small drops on floor after BM and in commode bucket. Pad in place for collection. Will continue to monitor. 7505:  Bedside shift change report given to Satish Castorena (oncoming nurse) by Jyoti Do RN (offgoing nurse).  Report included the following information SBAR, Kardex, Procedure Summary, MAR, Recent Results and Cardiac Rhythm .'

## 2020-05-30 LAB — HBV CORE AB SERPL QL IA: NEGATIVE

## 2020-06-02 LAB
BACTERIA SPEC CULT: NORMAL
BACTERIA SPEC CULT: NORMAL
SERVICE CMNT-IMP: NORMAL
SERVICE CMNT-IMP: NORMAL

## 2020-06-30 ENCOUNTER — HOSPITAL ENCOUNTER (OUTPATIENT)
Dept: LAB | Age: 80
Discharge: HOME | End: 2020-06-30
Payer: MEDICARE

## 2020-06-30 LAB
FOLATE SERPL-MCNC: 6.2 NG/ML (ref 3.1–17.5)
T4 FREE SERPL-MCNC: 1.2 NG/DL (ref 0.7–1.5)
TSH SERPL DL<=0.05 MIU/L-ACNC: 1.23 UIU/ML (ref 0.36–3.74)
VIT B12 SERPL-MCNC: >2000 PG/ML (ref 211–911)

## 2020-06-30 PROCEDURE — 84443 ASSAY THYROID STIM HORMONE: CPT

## 2020-06-30 PROCEDURE — 86592 SYPHILIS TEST NON-TREP QUAL: CPT

## 2020-06-30 PROCEDURE — 36415 COLL VENOUS BLD VENIPUNCTURE: CPT

## 2020-06-30 PROCEDURE — 84439 ASSAY OF FREE THYROXINE: CPT

## 2020-06-30 PROCEDURE — 82607 VITAMIN B-12: CPT

## 2020-07-01 LAB — RPR SER QL: NONREACTIVE

## 2020-07-02 LAB
FAX TO INFO,FAXT: NORMAL
FAX TO NUMBER,FAXN: NORMAL

## 2023-05-09 NOTE — BRIEF OP NOTE
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  Postoperative Diagnosis: same as preo  Procedure(s):  Left forearm Loop AVG pseudo aneurysm resection and primary repair (end to end)  Surgeon(s) and Role:     * Destini Dumont MD - Primary  Surgical Staff:  Circ-1: Amna Mcmillan  Scrub Tech-1: Candace Flatness  Surg Asst-1: Fady Joyner  Event Time In   Incision Start 1315   Incision Close 14:15     Anesthesia: General / LMA  Estimated Blood Loss: 10ml  Fluids: 250ml saline  Pre op antibiotic: Vancomycin  Specimens: none  Findings: 2 1/2 cm pseudoaneurysm medial /venous limb L FA loop AVG - resected with primary end to end repair, somewhat pulsatile thrill at completion  Complications: none  Implants: none Yes

## 2023-12-07 NOTE — PROGRESS NOTES
Shift Summary :  Very confused this shift. Assisted to call family  In the night with no response. Ate no dinner, but insisted that she had eaten. Insists that she is at home but when questioned, says she is not at her house. Heparin held this shift due to low platelet count. Time-based billing (NON-critical care)

## (undated) DEVICE — FLOSEAL MATRIX IS INDICATED IN SURGICAL PROCEDURES (OTHER THAN IN OPHTHALMIC) AS AN ADJUNCT TO HEMOSTASIS WHEN CONTROL OF BLEEDING BY LIGATURE OR CONVENTIONALPROCEDURES IS INEFFECTIVE OR IMPRACTICAL.: Brand: FLOSEAL HEMOSTATIC MATRIX

## (undated) DEVICE — SUT PROL 6-0 24IN BV1 DA BLU --

## (undated) DEVICE — INTENDED TO BE USED TO OCCLUDE, RETRACT AND IDENTIFY ARTERIES, VEINS, TENDONS AND NERVES IN SURGICAL PROCEDURES: Brand: STERION®  VESSEL LOOP

## (undated) DEVICE — VESSEL LOOPS,MINI, BLUE: Brand: DEVON

## (undated) DEVICE — STERILE POLYISOPRENE POWDER-FREE SURGICAL GLOVES: Brand: PROTEXIS

## (undated) DEVICE — SUTURE PERMA HND SZ 0 L18IN NONABSORBABLE BLK L30MM PSL REV 580H

## (undated) DEVICE — GAUZE SPONGES,8 PLY: Brand: CURITY

## (undated) DEVICE — STERILE POLYISOPRENE POWDER-FREE SURGICAL GLOVES WITH EMOLLIENT COATING: Brand: PROTEXIS

## (undated) DEVICE — SOLUTION IRRIG 1000ML H2O STRL BLT

## (undated) DEVICE — SUTURE PROL SZ 5-0 L24IN NONABSORBABLE BLU L9.3MM BV-1 3/8 9702H

## (undated) DEVICE — SHEET, DRAPE, SPLIT, STERILE: Brand: MEDLINE

## (undated) DEVICE — SOLUTION SCRB 16OZ SKIN 4% CHG CLN BASE W/ PMP FOR PT SKIN

## (undated) DEVICE — VESSEL LOOPS,MAXI, RED: Brand: DEVON

## (undated) DEVICE — SUTURE PERMAHAND SZ 2-0 L12X18IN NONABSORBABLE BLK SILK A185H

## (undated) DEVICE — SUTURE MCRYL SZ 4-0 L18IN ABSRB UD P-3 L13MM 3/8 CIR PRIM Y494G

## (undated) DEVICE — 3M™ TEGADERM™ TRANSPARENT FILM DRESSING FRAME STYLE, 1626W, 4 IN X 4-3/4 IN (10 CM X 12 CM), 50/CT 4CT/CASE: Brand: 3M™ TEGADERM™

## (undated) DEVICE — X-RAY SPONGES,12 PLY: Brand: DERMACEA

## (undated) DEVICE — KENDALL SCD EXPRESS SLEEVES, KNEE LENGTH, MEDIUM: Brand: KENDALL SCD

## (undated) DEVICE — MASTISOL ADHESIVE LIQ 2/3ML

## (undated) DEVICE — SOLUTION IV 500ML 0.9% SOD CHL FLX CONT

## (undated) DEVICE — GAUZE,SPONGE,8"X4",12PLY,XRAY,STRL,LF: Brand: MEDLINE

## (undated) DEVICE — PREP SKN CHLRAPRP 26ML TNT -- CONVERT TO ITEM 373320

## (undated) DEVICE — VESSEL LOOPS,MAXI, BLUE: Brand: DEVON

## (undated) DEVICE — SUTURE VCRL SZ 3-0 L27IN ABSRB UD L26MM SH 1/2 CIR J416H

## (undated) DEVICE — DEPAUL AV FISTULA PACK: Brand: MEDLINE INDUSTRIES, INC.

## (undated) DEVICE — INSULATED BLADE ELECTRODE: Brand: EDGE

## (undated) DEVICE — DERMABOND SKIN ADH 0.7ML -- DERMABOND ADVANCED 12/BX

## (undated) DEVICE — SOLUTION IV 1000ML 0.9% SOD CHL

## (undated) DEVICE — HANDPIECE SET WITH SOFT TISSUE TIP AND SUCTION TUBE: Brand: INTERPULSE

## (undated) DEVICE — BANDAGE COMPR W4INXL5YD BGE COHESIVE SELF ADH ADBAN CBN1104] AVCOR HEALTHCARE PRODUCTS INC]

## (undated) DEVICE — SUTURE PERMAHAND SZ 3-0 L18IN NONABSORBABLE BLK SILK BRAID A184H

## (undated) DEVICE — SUTURE NONABSORBABLE MONOFILAMENT 5-0 CV-6 TTC-9 24 IN GORTX 6K02A

## (undated) DEVICE — SUT SLK 4-0 18IN TIE MP BLK --

## (undated) DEVICE — NDL PRT INJ NSAF BLNT 18GX1.5 --

## (undated) DEVICE — GAUZE SPONGES,12 PLY: Brand: CURITY

## (undated) DEVICE — FLOSEAL VHSD STRL PREP 10ML

## (undated) DEVICE — (D)STRIP SKN CLSR 0.5X4IN WHT --

## (undated) DEVICE — SUTURE ETHLN SZ 2-0 L18IN NONABSORBABLE BLK L26MM PS 3/8 585H

## (undated) DEVICE — OCCLUSIVE GAUZE STRIP,3% BISMUTH TRIBROMOPHENATE IN PETROLATUM BLEND: Brand: XEROFORM

## (undated) DEVICE — SYR 10ML LUER LOK 1/5ML GRAD --

## (undated) DEVICE — 3M™ TEGADERM™ HP TRANSPARENT FILM DRESSING FRAME STYLE, 9534HP, 2-3/8 X 2-3/4 IN (6 CM X 7 CM), 100/CT 4CT/CASE: Brand: 3M™ TEGADERM™

## (undated) DEVICE — GLOVE SURG SZ 7.5 L11.73IN FNGR THK7.5MIL STRW LTX POLYMER

## (undated) DEVICE — LIGHT HANDLE: Brand: DEVON

## (undated) DEVICE — TOWEL SURG W16XL26IN BLU NONFENESTRATED DLX ST 2 PER PK

## (undated) DEVICE — SUT SLK 3-0 30IN SH BLK --

## (undated) DEVICE — INTENDED USED TO PROTECT, TAG AND HELP LOCATED SUTURES DURING SURGERY: Brand: STERION®SUTURE AID BOOTIES

## (undated) DEVICE — TAPE UMB 1/8X30IN MP COT WHT --